# Patient Record
Sex: FEMALE | Race: WHITE | NOT HISPANIC OR LATINO | URBAN - METROPOLITAN AREA
[De-identification: names, ages, dates, MRNs, and addresses within clinical notes are randomized per-mention and may not be internally consistent; named-entity substitution may affect disease eponyms.]

---

## 2019-03-07 ENCOUNTER — HOSPITAL ENCOUNTER (OUTPATIENT)
Dept: RADIOLOGY | Facility: MEDICAL CENTER | Age: 44
End: 2019-03-07

## 2019-03-07 ENCOUNTER — HOSPITAL ENCOUNTER (INPATIENT)
Facility: MEDICAL CENTER | Age: 44
LOS: 1 days | DRG: 660 | End: 2019-03-08
Attending: EMERGENCY MEDICINE | Admitting: INTERNAL MEDICINE
Payer: COMMERCIAL

## 2019-03-07 ENCOUNTER — APPOINTMENT (OUTPATIENT)
Dept: RADIOLOGY | Facility: MEDICAL CENTER | Age: 44
DRG: 660 | End: 2019-03-07
Attending: UROLOGY

## 2019-03-07 DIAGNOSIS — N13.30 HYDRONEPHROSIS, UNSPECIFIED HYDRONEPHROSIS TYPE: ICD-10-CM

## 2019-03-07 DIAGNOSIS — N20.0 NEPHROLITHIASIS: ICD-10-CM

## 2019-03-07 PROBLEM — R57.9 SHOCK (HCC): Status: ACTIVE | Noted: 2019-03-07

## 2019-03-07 PROBLEM — Z72.0 TOBACCO ABUSE: Status: ACTIVE | Noted: 2019-03-07

## 2019-03-07 PROBLEM — N39.0 UTI (URINARY TRACT INFECTION): Status: ACTIVE | Noted: 2019-03-07

## 2019-03-07 LAB
ALBUMIN SERPL BCP-MCNC: 2.7 G/DL (ref 3.2–4.9)
ALBUMIN/GLOB SERPL: 0.9 G/DL
ALP SERPL-CCNC: 73 U/L (ref 30–99)
ALT SERPL-CCNC: 16 U/L (ref 2–50)
ANION GAP SERPL CALC-SCNC: 9 MMOL/L (ref 0–11.9)
APTT PPP: 35.6 SEC (ref 24.7–36)
AST SERPL-CCNC: 16 U/L (ref 12–45)
BASOPHILS # BLD AUTO: 0.3 % (ref 0–1.8)
BASOPHILS # BLD: 0.06 K/UL (ref 0–0.12)
BILIRUB SERPL-MCNC: 0.7 MG/DL (ref 0.1–1.5)
BUN SERPL-MCNC: 11 MG/DL (ref 8–22)
CALCIUM SERPL-MCNC: 7.8 MG/DL (ref 8.4–10.2)
CHLORIDE SERPL-SCNC: 96 MMOL/L (ref 96–112)
CO2 SERPL-SCNC: 25 MMOL/L (ref 20–33)
CREAT SERPL-MCNC: 1.4 MG/DL (ref 0.5–1.4)
EOSINOPHIL # BLD AUTO: 0.01 K/UL (ref 0–0.51)
EOSINOPHIL NFR BLD: 0 % (ref 0–6.9)
ERYTHROCYTE [DISTWIDTH] IN BLOOD BY AUTOMATED COUNT: 43.3 FL (ref 35.9–50)
GLOBULIN SER CALC-MCNC: 3.1 G/DL (ref 1.9–3.5)
GLUCOSE SERPL-MCNC: 95 MG/DL (ref 65–99)
HCT VFR BLD AUTO: 38 % (ref 37–47)
HGB BLD-MCNC: 12.7 G/DL (ref 12–16)
IMM GRANULOCYTES # BLD AUTO: 0.28 K/UL (ref 0–0.11)
IMM GRANULOCYTES NFR BLD AUTO: 1.2 % (ref 0–0.9)
INR PPP: 1.26 (ref 0.87–1.13)
LACTATE BLD-SCNC: 1.4 MMOL/L (ref 0.5–2)
LACTATE BLD-SCNC: 2 MMOL/L (ref 0.5–2)
LYMPHOCYTES # BLD AUTO: 2 K/UL (ref 1–4.8)
LYMPHOCYTES NFR BLD: 8.5 % (ref 22–41)
MCH RBC QN AUTO: 28.4 PG (ref 27–33)
MCHC RBC AUTO-ENTMCNC: 33.4 G/DL (ref 33.6–35)
MCV RBC AUTO: 85 FL (ref 81.4–97.8)
MONOCYTES # BLD AUTO: 1.26 K/UL (ref 0–0.85)
MONOCYTES NFR BLD AUTO: 5.4 % (ref 0–13.4)
NEUTROPHILS # BLD AUTO: 19.86 K/UL (ref 2–7.15)
NEUTROPHILS NFR BLD: 84.6 % (ref 44–72)
NRBC # BLD AUTO: 0 K/UL
NRBC BLD-RTO: 0 /100 WBC
PLATELET # BLD AUTO: 233 K/UL (ref 164–446)
PMV BLD AUTO: 9.1 FL (ref 9–12.9)
POTASSIUM SERPL-SCNC: 3.4 MMOL/L (ref 3.6–5.5)
PROT SERPL-MCNC: 5.8 G/DL (ref 6–8.2)
PROTHROMBIN TIME: 15.7 SEC (ref 12–14.6)
RBC # BLD AUTO: 4.47 M/UL (ref 4.2–5.4)
SODIUM SERPL-SCNC: 130 MMOL/L (ref 135–145)
WBC # BLD AUTO: 23.5 K/UL (ref 4.8–10.8)

## 2019-03-07 PROCEDURE — 770006 HCHG ROOM/CARE - MED/SURG/GYN SEMI*

## 2019-03-07 PROCEDURE — A9270 NON-COVERED ITEM OR SERVICE: HCPCS | Performed by: INTERNAL MEDICINE

## 2019-03-07 PROCEDURE — 160009 HCHG ANES TIME/MIN: Performed by: UROLOGY

## 2019-03-07 PROCEDURE — 99291 CRITICAL CARE FIRST HOUR: CPT | Mod: 25 | Performed by: INTERNAL MEDICINE

## 2019-03-07 PROCEDURE — 85025 COMPLETE CBC W/AUTO DIFF WBC: CPT

## 2019-03-07 PROCEDURE — 87040 BLOOD CULTURE FOR BACTERIA: CPT

## 2019-03-07 PROCEDURE — 501838 HCHG SUTURE GENERAL: Performed by: UROLOGY

## 2019-03-07 PROCEDURE — 501329 HCHG SET, CYSTO IRRIG Y TUR: Performed by: UROLOGY

## 2019-03-07 PROCEDURE — 99291 CRITICAL CARE FIRST HOUR: CPT

## 2019-03-07 PROCEDURE — 83605 ASSAY OF LACTIC ACID: CPT

## 2019-03-07 PROCEDURE — 700101 HCHG RX REV CODE 250

## 2019-03-07 PROCEDURE — 700111 HCHG RX REV CODE 636 W/ 250 OVERRIDE (IP): Performed by: INTERNAL MEDICINE

## 2019-03-07 PROCEDURE — 99407 BEHAV CHNG SMOKING > 10 MIN: CPT | Performed by: INTERNAL MEDICINE

## 2019-03-07 PROCEDURE — 0T768DZ DILATION OF RIGHT URETER WITH INTRALUMINAL DEVICE, VIA NATURAL OR ARTIFICIAL OPENING ENDOSCOPIC: ICD-10-PCS | Performed by: UROLOGY

## 2019-03-07 PROCEDURE — 36415 COLL VENOUS BLD VENIPUNCTURE: CPT

## 2019-03-07 PROCEDURE — 80053 COMPREHEN METABOLIC PANEL: CPT

## 2019-03-07 PROCEDURE — 85610 PROTHROMBIN TIME: CPT

## 2019-03-07 PROCEDURE — 160036 HCHG PACU - EA ADDL 30 MINS PHASE I: Performed by: UROLOGY

## 2019-03-07 PROCEDURE — 160035 HCHG PACU - 1ST 60 MINS PHASE I: Performed by: UROLOGY

## 2019-03-07 PROCEDURE — 700102 HCHG RX REV CODE 250 W/ 637 OVERRIDE(OP): Performed by: INTERNAL MEDICINE

## 2019-03-07 PROCEDURE — 160048 HCHG OR STATISTICAL LEVEL 1-5: Performed by: UROLOGY

## 2019-03-07 PROCEDURE — 500879 HCHG PACK, CYSTO: Performed by: UROLOGY

## 2019-03-07 PROCEDURE — 700105 HCHG RX REV CODE 258: Performed by: EMERGENCY MEDICINE

## 2019-03-07 PROCEDURE — 85730 THROMBOPLASTIN TIME PARTIAL: CPT

## 2019-03-07 PROCEDURE — C2617 STENT, NON-COR, TEM W/O DEL: HCPCS | Performed by: UROLOGY

## 2019-03-07 PROCEDURE — 160002 HCHG RECOVERY MINUTES (STAT): Performed by: UROLOGY

## 2019-03-07 PROCEDURE — 700111 HCHG RX REV CODE 636 W/ 250 OVERRIDE (IP)

## 2019-03-07 PROCEDURE — 700105 HCHG RX REV CODE 258: Performed by: INTERNAL MEDICINE

## 2019-03-07 PROCEDURE — 160028 HCHG SURGERY MINUTES - 1ST 30 MINS LEVEL 3: Performed by: UROLOGY

## 2019-03-07 DEVICE — STENT UROLOGICAL POLARIS 6X26  ULTRA: Type: IMPLANTABLE DEVICE | Site: URETER | Status: FUNCTIONAL

## 2019-03-07 RX ORDER — BISACODYL 10 MG
10 SUPPOSITORY, RECTAL RECTAL
Status: DISCONTINUED | OUTPATIENT
Start: 2019-03-07 | End: 2019-03-08 | Stop reason: HOSPADM

## 2019-03-07 RX ORDER — OXYCODONE HCL 5 MG/5 ML
5 SOLUTION, ORAL ORAL
Status: DISCONTINUED | OUTPATIENT
Start: 2019-03-07 | End: 2019-03-07 | Stop reason: HOSPADM

## 2019-03-07 RX ORDER — HYDRALAZINE HYDROCHLORIDE 20 MG/ML
5 INJECTION INTRAMUSCULAR; INTRAVENOUS
Status: DISCONTINUED | OUTPATIENT
Start: 2019-03-07 | End: 2019-03-07 | Stop reason: HOSPADM

## 2019-03-07 RX ORDER — OXYCODONE HYDROCHLORIDE 5 MG/1
10 TABLET ORAL
Status: DISCONTINUED | OUTPATIENT
Start: 2019-03-07 | End: 2019-03-08 | Stop reason: HOSPADM

## 2019-03-07 RX ORDER — HYDROMORPHONE HYDROCHLORIDE 1 MG/ML
0.1 INJECTION, SOLUTION INTRAMUSCULAR; INTRAVENOUS; SUBCUTANEOUS
Status: DISCONTINUED | OUTPATIENT
Start: 2019-03-07 | End: 2019-03-07 | Stop reason: HOSPADM

## 2019-03-07 RX ORDER — ONDANSETRON 2 MG/ML
4 INJECTION INTRAMUSCULAR; INTRAVENOUS
Status: DISCONTINUED | OUTPATIENT
Start: 2019-03-07 | End: 2019-03-07 | Stop reason: HOSPADM

## 2019-03-07 RX ORDER — OXYCODONE HCL 5 MG/5 ML
10 SOLUTION, ORAL ORAL
Status: DISCONTINUED | OUTPATIENT
Start: 2019-03-07 | End: 2019-03-07 | Stop reason: HOSPADM

## 2019-03-07 RX ORDER — KETOROLAC TROMETHAMINE 30 MG/ML
30 INJECTION, SOLUTION INTRAMUSCULAR; INTRAVENOUS EVERY 6 HOURS PRN
Status: DISCONTINUED | OUTPATIENT
Start: 2019-03-07 | End: 2019-03-08 | Stop reason: HOSPADM

## 2019-03-07 RX ORDER — ACETAMINOPHEN 325 MG/1
650 TABLET ORAL EVERY 6 HOURS PRN
Status: DISCONTINUED | OUTPATIENT
Start: 2019-03-07 | End: 2019-03-08 | Stop reason: HOSPADM

## 2019-03-07 RX ORDER — SODIUM CHLORIDE 9 MG/ML
30 INJECTION, SOLUTION INTRAVENOUS
Status: DISCONTINUED | OUTPATIENT
Start: 2019-03-07 | End: 2019-03-08 | Stop reason: HOSPADM

## 2019-03-07 RX ORDER — PROMETHAZINE HYDROCHLORIDE 25 MG/1
12.5-25 SUPPOSITORY RECTAL EVERY 4 HOURS PRN
Status: DISCONTINUED | OUTPATIENT
Start: 2019-03-07 | End: 2019-03-08 | Stop reason: HOSPADM

## 2019-03-07 RX ORDER — ONDANSETRON 4 MG/1
4 TABLET, ORALLY DISINTEGRATING ORAL EVERY 4 HOURS PRN
Status: DISCONTINUED | OUTPATIENT
Start: 2019-03-07 | End: 2019-03-08 | Stop reason: HOSPADM

## 2019-03-07 RX ORDER — HYDROMORPHONE HYDROCHLORIDE 1 MG/ML
0.4 INJECTION, SOLUTION INTRAMUSCULAR; INTRAVENOUS; SUBCUTANEOUS
Status: DISCONTINUED | OUTPATIENT
Start: 2019-03-07 | End: 2019-03-07 | Stop reason: HOSPADM

## 2019-03-07 RX ORDER — SODIUM CHLORIDE 9 MG/ML
1000 INJECTION, SOLUTION INTRAVENOUS ONCE
Status: COMPLETED | OUTPATIENT
Start: 2019-03-07 | End: 2019-03-07

## 2019-03-07 RX ORDER — MEPERIDINE HYDROCHLORIDE 25 MG/ML
12.5 INJECTION INTRAMUSCULAR; INTRAVENOUS; SUBCUTANEOUS
Status: DISCONTINUED | OUTPATIENT
Start: 2019-03-07 | End: 2019-03-07 | Stop reason: HOSPADM

## 2019-03-07 RX ORDER — SODIUM CHLORIDE 9 MG/ML
INJECTION, SOLUTION INTRAVENOUS CONTINUOUS
Status: DISCONTINUED | OUTPATIENT
Start: 2019-03-07 | End: 2019-03-08 | Stop reason: HOSPADM

## 2019-03-07 RX ORDER — PROMETHAZINE HYDROCHLORIDE 25 MG/1
12.5-25 TABLET ORAL EVERY 4 HOURS PRN
Status: DISCONTINUED | OUTPATIENT
Start: 2019-03-07 | End: 2019-03-08 | Stop reason: HOSPADM

## 2019-03-07 RX ORDER — POLYETHYLENE GLYCOL 3350 17 G/17G
1 POWDER, FOR SOLUTION ORAL
Status: DISCONTINUED | OUTPATIENT
Start: 2019-03-07 | End: 2019-03-08 | Stop reason: HOSPADM

## 2019-03-07 RX ORDER — ONDANSETRON 2 MG/ML
4 INJECTION INTRAMUSCULAR; INTRAVENOUS EVERY 4 HOURS PRN
Status: DISCONTINUED | OUTPATIENT
Start: 2019-03-07 | End: 2019-03-08 | Stop reason: HOSPADM

## 2019-03-07 RX ORDER — SODIUM CHLORIDE 9 MG/ML
1000 INJECTION, SOLUTION INTRAVENOUS
Status: DISCONTINUED | OUTPATIENT
Start: 2019-03-07 | End: 2019-03-08 | Stop reason: HOSPADM

## 2019-03-07 RX ORDER — DIPHENHYDRAMINE HYDROCHLORIDE 50 MG/ML
6.25 INJECTION INTRAMUSCULAR; INTRAVENOUS
Status: DISCONTINUED | OUTPATIENT
Start: 2019-03-07 | End: 2019-03-07 | Stop reason: HOSPADM

## 2019-03-07 RX ORDER — HYDROMORPHONE HYDROCHLORIDE 1 MG/ML
0.5 INJECTION, SOLUTION INTRAMUSCULAR; INTRAVENOUS; SUBCUTANEOUS
Status: DISCONTINUED | OUTPATIENT
Start: 2019-03-07 | End: 2019-03-08 | Stop reason: HOSPADM

## 2019-03-07 RX ORDER — SODIUM CHLORIDE, SODIUM LACTATE, POTASSIUM CHLORIDE, CALCIUM CHLORIDE 600; 310; 30; 20 MG/100ML; MG/100ML; MG/100ML; MG/100ML
INJECTION, SOLUTION INTRAVENOUS CONTINUOUS
Status: DISCONTINUED | OUTPATIENT
Start: 2019-03-07 | End: 2019-03-07 | Stop reason: HOSPADM

## 2019-03-07 RX ORDER — HYDRALAZINE HYDROCHLORIDE 20 MG/ML
10 INJECTION INTRAMUSCULAR; INTRAVENOUS EVERY 4 HOURS PRN
Status: DISCONTINUED | OUTPATIENT
Start: 2019-03-07 | End: 2019-03-08 | Stop reason: HOSPADM

## 2019-03-07 RX ORDER — AMOXICILLIN 250 MG
2 CAPSULE ORAL 2 TIMES DAILY
Status: DISCONTINUED | OUTPATIENT
Start: 2019-03-07 | End: 2019-03-08 | Stop reason: HOSPADM

## 2019-03-07 RX ORDER — OXYCODONE HYDROCHLORIDE 5 MG/1
5 TABLET ORAL
Status: DISCONTINUED | OUTPATIENT
Start: 2019-03-07 | End: 2019-03-08 | Stop reason: HOSPADM

## 2019-03-07 RX ORDER — HYDROMORPHONE HYDROCHLORIDE 1 MG/ML
0.2 INJECTION, SOLUTION INTRAMUSCULAR; INTRAVENOUS; SUBCUTANEOUS
Status: DISCONTINUED | OUTPATIENT
Start: 2019-03-07 | End: 2019-03-07 | Stop reason: HOSPADM

## 2019-03-07 RX ORDER — HALOPERIDOL 5 MG/ML
1 INJECTION INTRAMUSCULAR
Status: DISCONTINUED | OUTPATIENT
Start: 2019-03-07 | End: 2019-03-07 | Stop reason: HOSPADM

## 2019-03-07 RX ORDER — IBUPROFEN 200 MG
400 TABLET ORAL EVERY 6 HOURS PRN
COMMUNITY

## 2019-03-07 RX ADMIN — STANDARDIZED SENNA CONCENTRATE AND DOCUSATE SODIUM 2 TABLET: 8.6; 5 TABLET, FILM COATED ORAL at 21:04

## 2019-03-07 RX ADMIN — SODIUM CHLORIDE 1000 ML: 9 INJECTION, SOLUTION INTRAVENOUS at 16:48

## 2019-03-07 RX ADMIN — SODIUM CHLORIDE: 9 INJECTION, SOLUTION INTRAVENOUS at 21:08

## 2019-03-07 RX ADMIN — CEFTRIAXONE SODIUM 2 G: 2 INJECTION, POWDER, FOR SOLUTION INTRAMUSCULAR; INTRAVENOUS at 21:05

## 2019-03-07 ASSESSMENT — ENCOUNTER SYMPTOMS
COUGH: 0
NAUSEA: 1
FEVER: 1
SPUTUM PRODUCTION: 0
STRIDOR: 0
DEPRESSION: 0
FLANK PAIN: 1
LOSS OF CONSCIOUSNESS: 0
WEAKNESS: 0
FALLS: 0
DIARRHEA: 0
HEADACHES: 0
VOMITING: 1
TINGLING: 0
CONSTIPATION: 0
CHILLS: 1
MYALGIAS: 0
PALPITATIONS: 0
SHORTNESS OF BREATH: 0
ABDOMINAL PAIN: 1
DIZZINESS: 0

## 2019-03-07 ASSESSMENT — COGNITIVE AND FUNCTIONAL STATUS - GENERAL
DAILY ACTIVITIY SCORE: 24
SUGGESTED CMS G CODE MODIFIER DAILY ACTIVITY: CH
MOBILITY SCORE: 24
SUGGESTED CMS G CODE MODIFIER MOBILITY: CH

## 2019-03-07 ASSESSMENT — COPD QUESTIONNAIRES
DO YOU EVER COUGH UP ANY MUCUS OR PHLEGM?: NO/ONLY WITH OCCASIONAL COLDS OR INFECTIONS
DURING THE PAST 4 WEEKS HOW MUCH DID YOU FEEL SHORT OF BREATH: SOME OF THE TIME
HAVE YOU SMOKED AT LEAST 100 CIGARETTES IN YOUR ENTIRE LIFE: YES
IN THE PAST 12 MONTHS DO YOU DO LESS THAN YOU USED TO BECAUSE OF YOUR BREATHING PROBLEMS: DISAGREE/UNSURE
COPD SCREENING SCORE: 3

## 2019-03-07 ASSESSMENT — LIFESTYLE VARIABLES
ALCOHOL_USE: NO
EVER_SMOKED: YES

## 2019-03-07 ASSESSMENT — PATIENT HEALTH QUESTIONNAIRE - PHQ9
2. FEELING DOWN, DEPRESSED, IRRITABLE, OR HOPELESS: NOT AT ALL
SUM OF ALL RESPONSES TO PHQ9 QUESTIONS 1 AND 2: 0
1. LITTLE INTEREST OR PLEASURE IN DOING THINGS: NOT AT ALL

## 2019-03-07 NOTE — ED TRIAGE NOTES
".  Chief Complaint   Patient presents with   • Nephrolithiasis     Pt sent here from [facility] for an obstructive kidney stone on the right side.     .BP (!) 85/56   Pulse 83   Temp 37 °C (98.6 °F) (Temporal)   Resp 16   Ht 1.626 m (5' 4\")   Wt 65.8 kg (145 lb)   LMP  (Within Weeks)   SpO2 96%   BMI 24.89 kg/m²     Prior to transport in [facility] pt received, 8mg morphine, 4mg zofran, 30mg toradol, and 1g rocephin  "

## 2019-03-07 NOTE — ED NOTES
Med rec complete per pt at bedside  Allergies have been verified and updated  No oral ABX within the last 30 days

## 2019-03-08 ENCOUNTER — PATIENT OUTREACH (OUTPATIENT)
Dept: HEALTH INFORMATION MANAGEMENT | Facility: OTHER | Age: 44
End: 2019-03-08

## 2019-03-08 VITALS
RESPIRATION RATE: 18 BRPM | DIASTOLIC BLOOD PRESSURE: 62 MMHG | SYSTOLIC BLOOD PRESSURE: 107 MMHG | HEART RATE: 72 BPM | TEMPERATURE: 98.1 F | OXYGEN SATURATION: 97 % | BODY MASS INDEX: 24.75 KG/M2 | WEIGHT: 145 LBS | HEIGHT: 64 IN

## 2019-03-08 LAB
ALBUMIN SERPL BCP-MCNC: 2.4 G/DL (ref 3.2–4.9)
ALBUMIN/GLOB SERPL: 0.7 G/DL
ALP SERPL-CCNC: 81 U/L (ref 30–99)
ALT SERPL-CCNC: 21 U/L (ref 2–50)
ANION GAP SERPL CALC-SCNC: 8 MMOL/L (ref 0–11.9)
AST SERPL-CCNC: 21 U/L (ref 12–45)
BASOPHILS # BLD AUTO: 0.2 % (ref 0–1.8)
BASOPHILS # BLD: 0.04 K/UL (ref 0–0.12)
BILIRUB SERPL-MCNC: 0.6 MG/DL (ref 0.1–1.5)
BUN SERPL-MCNC: 17 MG/DL (ref 8–22)
CALCIUM SERPL-MCNC: 7.9 MG/DL (ref 8.4–10.2)
CHLORIDE SERPL-SCNC: 103 MMOL/L (ref 96–112)
CO2 SERPL-SCNC: 24 MMOL/L (ref 20–33)
CREAT SERPL-MCNC: 1.17 MG/DL (ref 0.5–1.4)
EOSINOPHIL # BLD AUTO: 0 K/UL (ref 0–0.51)
EOSINOPHIL NFR BLD: 0 % (ref 0–6.9)
ERYTHROCYTE [DISTWIDTH] IN BLOOD BY AUTOMATED COUNT: 44.8 FL (ref 35.9–50)
GLOBULIN SER CALC-MCNC: 3.3 G/DL (ref 1.9–3.5)
GLUCOSE SERPL-MCNC: 133 MG/DL (ref 65–99)
HCT VFR BLD AUTO: 35.4 % (ref 37–47)
HGB BLD-MCNC: 11.6 G/DL (ref 12–16)
IMM GRANULOCYTES # BLD AUTO: 0.23 K/UL (ref 0–0.11)
IMM GRANULOCYTES NFR BLD AUTO: 1.2 % (ref 0–0.9)
LACTATE BLD-SCNC: 1 MMOL/L (ref 0.5–2)
LACTATE BLD-SCNC: 1.3 MMOL/L (ref 0.5–2)
LACTATE BLD-SCNC: 2.1 MMOL/L (ref 0.5–2)
LYMPHOCYTES # BLD AUTO: 1 K/UL (ref 1–4.8)
LYMPHOCYTES NFR BLD: 5.2 % (ref 22–41)
MCH RBC QN AUTO: 28.3 PG (ref 27–33)
MCHC RBC AUTO-ENTMCNC: 32.8 G/DL (ref 33.6–35)
MCV RBC AUTO: 86.3 FL (ref 81.4–97.8)
MONOCYTES # BLD AUTO: 0.78 K/UL (ref 0–0.85)
MONOCYTES NFR BLD AUTO: 4 % (ref 0–13.4)
NEUTROPHILS # BLD AUTO: 17.24 K/UL (ref 2–7.15)
NEUTROPHILS NFR BLD: 89.4 % (ref 44–72)
NRBC # BLD AUTO: 0 K/UL
NRBC BLD-RTO: 0 /100 WBC
PLATELET # BLD AUTO: 231 K/UL (ref 164–446)
PMV BLD AUTO: 9.5 FL (ref 9–12.9)
POTASSIUM SERPL-SCNC: 4.2 MMOL/L (ref 3.6–5.5)
PROT SERPL-MCNC: 5.7 G/DL (ref 6–8.2)
RBC # BLD AUTO: 4.1 M/UL (ref 4.2–5.4)
SODIUM SERPL-SCNC: 135 MMOL/L (ref 135–145)
WBC # BLD AUTO: 19.3 K/UL (ref 4.8–10.8)

## 2019-03-08 PROCEDURE — 36415 COLL VENOUS BLD VENIPUNCTURE: CPT

## 2019-03-08 PROCEDURE — 99239 HOSP IP/OBS DSCHRG MGMT >30: CPT | Performed by: HOSPITALIST

## 2019-03-08 PROCEDURE — 85025 COMPLETE CBC W/AUTO DIFF WBC: CPT

## 2019-03-08 PROCEDURE — 80053 COMPREHEN METABOLIC PANEL: CPT

## 2019-03-08 PROCEDURE — 83605 ASSAY OF LACTIC ACID: CPT | Mod: 91

## 2019-03-08 PROCEDURE — 700102 HCHG RX REV CODE 250 W/ 637 OVERRIDE(OP): Performed by: INTERNAL MEDICINE

## 2019-03-08 PROCEDURE — A9270 NON-COVERED ITEM OR SERVICE: HCPCS | Performed by: INTERNAL MEDICINE

## 2019-03-08 RX ORDER — OXYCODONE HYDROCHLORIDE 5 MG/1
10 TABLET ORAL
Qty: 10 TAB | Refills: 0 | Status: SHIPPED | OUTPATIENT
Start: 2019-03-08 | End: 2019-03-15

## 2019-03-08 RX ORDER — PHENAZOPYRIDINE HYDROCHLORIDE 200 MG/1
200 TABLET, FILM COATED ORAL 3 TIMES DAILY PRN
Qty: 20 TAB | Refills: 0 | Status: SHIPPED | OUTPATIENT
Start: 2019-03-08 | End: 2019-03-15

## 2019-03-08 RX ORDER — OXYBUTYNIN CHLORIDE 10 MG/1
10 TABLET, EXTENDED RELEASE ORAL DAILY
Qty: 7 TAB | Refills: 0 | Status: SHIPPED | OUTPATIENT
Start: 2019-03-08 | End: 2019-03-15

## 2019-03-08 RX ORDER — CIPROFLOXACIN 500 MG/1
500 TABLET, FILM COATED ORAL 2 TIMES DAILY
Qty: 14 TAB | Refills: 0 | Status: SHIPPED | OUTPATIENT
Start: 2019-03-08 | End: 2019-03-15

## 2019-03-08 RX ADMIN — STANDARDIZED SENNA CONCENTRATE AND DOCUSATE SODIUM 2 TABLET: 8.6; 5 TABLET, FILM COATED ORAL at 05:23

## 2019-03-08 RX ADMIN — OXYCODONE HYDROCHLORIDE 5 MG: 5 TABLET ORAL at 11:04

## 2019-03-08 ASSESSMENT — PATIENT HEALTH QUESTIONNAIRE - PHQ9
2. FEELING DOWN, DEPRESSED, IRRITABLE, OR HOPELESS: NOT AT ALL
1. LITTLE INTEREST OR PLEASURE IN DOING THINGS: NOT AT ALL
SUM OF ALL RESPONSES TO PHQ9 QUESTIONS 1 AND 2: 0

## 2019-03-08 NOTE — PROGRESS NOTES
Received report from NOC RN; assumed pt care. Pt A&Ox4, sitting up in bed. Pt states 5/10 bladder pain. Pt voiding appropriately. Pt unaware if she can get a ride home today, lives in [place], [place], will call around and ask friends. Pt notified to call for assistance.

## 2019-03-08 NOTE — ED PROVIDER NOTES
"ED Provider Note    CHIEF COMPLAINT  Chief Complaint   Patient presents with   • Nephrolithiasis     Pt sent here from [facility] for an obstructive kidney stone on the right side.       HPI  Mariana Aguero is a 43 y.o. female who presents sent form [place] for an obstructing right kidney stone.  She has had 2-3 days of persistent nausea vomiting and abdominal pain.  She has been unable to keep anything down for the last 2 days.  She reports subjective fevers as well.  She is a smoker and has not had any cigarettes for the last 3 days because she is been feeling so bad.  She denies any chest pain or shortness of breath.  She has some history of a prior UTI.  She does not take any other medications.  Pain is better when she lays on her right side.      REVIEW OF SYSTEMS  positive for right-sided flank pain nausea vomiting subjective fevers, negative for diarrhea. All other systems are negative.     PAST MEDICAL HISTORY       SOCIAL HISTORY  Social History     Social History Main Topics   • Smoking status: Former Smoker     Quit date: 3/4/2019   • Smokeless tobacco: Never Used   • Alcohol use No   • Drug use: No   • Sexual activity: Not on file       SURGICAL HISTORY   has a past surgical history that includes tubal coagulation laparoscopic bilateral (1995).    CURRENT MEDICATIONS  Home Medications     Reviewed by Graeme Huynh (Pharmacy Tech) on 03/07/19 at 1548  Med List Status: Complete   Medication Last Dose Status   ibuprofen (MOTRIN) 200 MG Tab 3/4/2019 Active                ALLERGIES  Allergies   Allergen Reactions   • Penicillins Unspecified     Childhood        PHYSICAL EXAM  VITAL SIGNS: BP (!) 85/56   Pulse 90   Temp 36.1 °C (97 °F) (Temporal)   Resp (!) 22   Ht 1.626 m (5' 4\")   Wt 65.8 kg (145 lb)   LMP  (Within Weeks)   SpO2 97%   BMI 24.89 kg/m² .  Constitutional: Alert in no apparent distress.  HENT: No signs of trauma, Bilateral external ears normal, Nose normal.   Eyes: Pupils are " "equal and reactive, Conjunctiva normal, Non-icteric.   Neck: Normal range of motion, No tenderness, Supple, No stridor.   Cardiovascular: Regular rate and rhythm, no murmurs.   Thorax & Lungs: Normal breath sounds, No respiratory distress, No wheezing, No chest tenderness.   Abdomen: Bowel sounds normal, Soft, No tenderness, No masses, No peritoneal signs.  Skin: Warm, Dry, No erythema, No rash.   Back: No bony tenderness, right side CVA tenderness.   Musculoskeletal:  no major deformities noted.   Neurologic: Alert,  No focal deficits noted.   Psychiatric: Affect normal, Judgment normal, Mood normal.       DIAGNOSTIC STUDIES / PROCEDURES        LABS  Labs Reviewed   CBC WITH DIFFERENTIAL - Abnormal; Notable for the following:        Result Value    WBC 23.5 (*)     MCHC 33.4 (*)     Neutrophils-Polys 84.60 (*)     Lymphocytes 8.50 (*)     Immature Granulocytes 1.20 (*)     Neutrophils (Absolute) 19.86 (*)     Monos (Absolute) 1.26 (*)     Immature Granulocytes (abs) 0.28 (*)     All other components within normal limits   COMP METABOLIC PANEL - Abnormal; Notable for the following:     Sodium 130 (*)     Potassium 3.4 (*)     Calcium 7.8 (*)     Albumin 2.7 (*)     Total Protein 5.8 (*)     All other components within normal limits   PROTHROMBIN TIME - Abnormal; Notable for the following:     PT 15.7 (*)     INR 1.26 (*)     All other components within normal limits    Narrative:     If not done within the last 4 hours  Indicate which anticoagulants the patient is on:->NONE   ESTIMATED GFR - Abnormal; Notable for the following:     GFR If  49 (*)     GFR If Non  41 (*)     All other components within normal limits   BLOOD CULTURE    Narrative:     Per Hospital Policy: Only change Specimen Src: to \"Line\" if  specified by physician order.   LACTIC ACID   APTT    Narrative:     If not done within the last 4 hours  Indicate which anticoagulants the patient is on:->NONE   BLOOD CULTURE "   URINALYSIS   CULTURE RESPIRATORY W/ GRM STN   LACTIC ACID           COURSE & MEDICAL DECISION MAKING  Pertinent Labs & Imaging studies reviewed. (See chart for details)    Records from [place] are reviewed.  She had a white count of 27.1.  She had a sodium of 130 potassium 3.5 creatinine of 1.43.  Her urinalysis was positive for 2+ protein 2+ blood and 3+ leukocyte esterase.  Microscopic UA showed  white blood cells 5-10 RBCs and a few bacteria.  CT scan showed an 11.6 x 6.8 mm obstructing stone in the proximal right ureter with severe right sided hydronephrosis.  She was given 1 g of Rocephin as well as Zofran and Toradol and morphine.    This is a 43-year-old who presents with a obstructing right sided ureteral stone.  She was given Rocephin for concern of UTI although it is unclear if her urinalysis was a straight cath or clean catch.  She is afebrile here she is not tachycardic.  I do not think she is acutely septic although she does have some slight hypotension for this she was given IV fluids.  Repeat labs show white count at 23.5.  Lactate is normal at 1.4.    I spoke with Dr. Carrillo, urology who will take the patient to the operating room for a stent.  She will be admitted to the hospitalist service for further management.  I spoke with Dr. Chamorro who is agreeable to admit.    Patient will be admitted to the hospitalist service in guarded condition.    HYDRATION: Based on the patient's presentation of Hypotension the patient was given IV fluids. IV Hydration was used because oral hydration was not as rapid as required. Upon recheck following hydration, the patient was Brought to the operating room before reevaluation can occur.          FINAL IMPRESSION  1. Hydronephrosis, unspecified hydronephrosis type    2. Nephrolithiasis          This dictation has been creating using voice recognition software. The accuracy of the dictation is limited the abilities of the software.  I expect there may be some  errors of grammar and possibly content. I made every attempt to manually correct the errors within my dictation. However errors related to this voice recognition software may still exist and should be interpreted within the appropriate context.      The note accurately reflects work and decisions made by me.  Zuleyma Dickson  3/7/2019  6:16 PM

## 2019-03-08 NOTE — ED NOTES
Blood work optained-in no apparent distress, resting quietly on side. states pain 7/10 to rt side and back

## 2019-03-08 NOTE — DISCHARGE INSTRUCTIONS
Discharge Instructions    Discharged to home by car with relative. Discharged via wheelchair, hospital escort: Yes.  Special equipment needed: Not Applicable    Be sure to schedule a follow-up appointment with your primary care doctor or any specialists as instructed.     Discharge Plan:   Pneumococcal Vaccine Administered/Refused: Not given - Patient refused pneumococcal vaccine  Influenza Vaccine Indication: Indicated: 9 to 64 years of age    I understand that a diet low in cholesterol, fat, and sodium is recommended for good health. Unless I have been given specific instructions below for another diet, I accept this instruction as my diet prescription.   Other diet: Regular    Special Instructions: None    · Is patient discharged on Warfarin / Coumadin?   No     Depression / Suicide Risk    As you are discharged from this [facility] facility, it is important to learn how to keep safe from harming yourself.    Recognize the warning signs:  · Abrupt changes in personality, positive or negative- including increase in energy   · Giving away possessions  · Change in eating patterns- significant weight changes-  positive or negative  · Change in sleeping patterns- unable to sleep or sleeping all the time   · Unwillingness or inability to communicate  · Depression  · Unusual sadness, discouragement and loneliness  · Talk of wanting to die  · Neglect of personal appearance   · Rebelliousness- reckless behavior  · Withdrawal from people/activities they love  · Confusion- inability to concentrate     If you or a loved one observes any of these behaviors or has concerns about self-harm, here's what you can do:  · Talk about it- your feelings and reasons for harming yourself  · Remove any means that you might use to hurt yourself (examples: pills, rope, extension cords, firearm)  · Get professional help from the community (Mental Health, Substance Abuse, psychological counseling)  · Do not be alone:Call your Safe Contact-  someone whom you trust who will be there for you.  · Call your local CRISIS HOTLINE 431-4790 or 021-427-6485  · Call your local Children's Mobile Crisis Response Team [place] (805) 851-1912 or www.[employer]  · Call the toll free National Suicide Prevention Hotlines   · National Suicide Prevention Lifeline 257-193-GNBF (1320)  · "[employer] Line Network 800-SUICIDE (141-6630)

## 2019-03-08 NOTE — DISCHARGE PLANNING
Anticipated Discharge Disposition: pt to d/c back home to [place]    Action: Spoke to bedside RN. Pt states she has no transportation back home and has no friends or relatives who own a car.    Barriers to Discharge: TBD    Plan: f/u w/ pt, bedside RN

## 2019-03-08 NOTE — PROGRESS NOTES
"Urology Progress Note    Post op Day # 1. Right stent placement    Overnight Events: None    S: No fevers, chills, nausea or vomiting.  Pain improved. Labs stable. No complaints.    O:   Blood pressure 107/62, pulse 72, temperature 36.7 °C (98.1 °F), temperature source Oral, resp. rate 18, height 1.626 m (5' 4\"), weight 65.8 kg (145 lb), last menstrual period 02/03/2019, SpO2 97 %, not currently breastfeeding.  Recent Labs      03/07/19   1651  03/08/19   0506   SODIUM  130*  135   POTASSIUM  3.4*  4.2   CHLORIDE  96  103   CO2  25  24   GLUCOSE  95  133*   BUN  11  17   CREATININE  1.40  1.17   CALCIUM  7.8*  7.9*     Recent Labs      03/07/19   1651  03/08/19   0506   WBC  23.5*  19.3*   RBC  4.47  4.10*   HEMOGLOBIN  12.7  11.6*   HEMATOCRIT  38.0  35.4*   MCV  85.0  86.3   MCH  28.4  28.3   MCHC  33.4*  32.8*   RDW  43.3  44.8   PLATELETCT  233  231   MPV  9.1  9.5         Intake/Output Summary (Last 24 hours) at 03/08/19 0821  Last data filed at 03/07/19 2315   Gross per 24 hour   Intake             3390 ml   Output             1175 ml   Net             2215 ml       Exam:  Abdomen soft, benign.   Urine: pink      A/P:    Active Hospital Problems    Diagnosis   • Nephrolithiasis [N20.0]     Priority: High   • Hydronephrosis [N13.30]     Priority: Medium   • UTI (urinary tract infection) [N39.0]     Priority: Medium   • Shock (HCC) [R57.9]     Priority: Medium   • Tobacco abuse [Z72.0]       Stable.   Ambulate, IS.  Urology ok with DC home  F/U with Dr Tee for R CULTS 1-2 weeks  "

## 2019-03-08 NOTE — CONSULTS
"DATE OF SERVICE:  03/07/2019    UROLOGY CONSULTATION    REASON FOR CONSULTATION:  Urology service was consulted by Dr. Dickson of the   emergency department for advice and opinion regarding this woman's obstructing   ureteral stone and likely urinary tract infection.    HISTORY OF PRESENT ILLNESS:  The patient is 43.  Beginning 2 days ago, with   the onset of left-sided flank and abdominal pain.  This has been associated   with ongoing nausea and vomiting.  She has had tactile fevers.  No reported   chills.  No dysuria, but she claims her urine has been feeling \"hot.\"  No GI   complaints.    For detailed account of the patient's past medical, surgical, social history   and review of systems, please see Dr. Dickson's history and physical dated   today in the patient's chart.    PHYSICAL EXAMINATION:  GENERAL:  Well-nourished, well-developed female, overweight, lying in [facility] in no acute distress.  VITAL SIGNS:  Temperature is 37, pulse 90, blood pressure 85/56.  HEENT:  Oropharynx is clear.  NECK:  No cervical lymphadenopathy.  CHEST:  Rises symmetric.  HEART:  Regular, 2+ radial pulses bilaterally.  SKIN:  Warm and dry.  NEUROLOGIC:  Grossly intact.  EXTREMITIES:  No lower extremity edema.  ABDOMEN:  Soft, nondistended, nontender.  No suprapubic fullness or   tenderness.    LABORATORY DATA:  White blood cell count 24,000, hematocrit 38.  Creatinine   1.4, potassium 3.4.  CT scan images reviewed demonstrates moderate right   hydronephrosis, perinephric stranding.  An 11 mm proximal ureteral stone.  No   other stones identified.  Urinalysis, outside urinalysis is suggestive of   infection.  She has been treated with IV antibiotics.    IMPRESSION AND PLAN:  A 43-year-old woman with an 11 mm proximal right   ureteral stone with findings highly suggestive of urinary tract infection.  I   explained my concerns for this as a set up for developing sepsis and I have   advocated for placement of a ureteral stent " to allow for drainage of the   system.  I have also explained the risks of this procedure, which include but   not limited to bleeding, infection, injury to the ureter, kidney and need for   further stone surgery, stent colic, absolute need for followup.  The patient   expressed adequate understanding and wished to proceed.       ____________________________________     Gus Tee MD MCM / NTS    DD:  03/07/2019 17:33:50  DT:  03/07/2019 22:00:52    D#:  [id number]  Job#:  [id number]    cc: Zuleyma Dickson MD

## 2019-03-08 NOTE — DIETARY
"Nutrition services: Day 1 of admit.  Mariana Aguero is a 43 y.o. female with admitting DX of hydronephrosis with stent placed by urology.  Consult received for poor PO PTA.      Assessment:  Height: 162.6 cm (5' 4\")  Weight: 65.8 kg (145 lb)  Body mass index is 24.89 kg/m².  Diet/Intake: regular/ 50-75%    Evaluation:   1. Per MD documentation, pt was in her usual state of health until a couple of days ago. Poor PO intake was caused by  pain, nausea and vomiting related to current diagnosis. Recorded PO intake is good after recent procedure. Pt with BMI indicating she is at a healthy weight.     Malnutrition Risk: No criteria met for malnutrition    Recommendations/Plan:  1. Provide diet as ordered.    2. Encourage intake of > 50%  3. Document intake of all meals  as % taken in ADL's to provide interdisciplinary communication across all shifts.   4. Monitor weight.  5. Nutrition rep will continue to see patient for ongoing meal and snack preferences.   6. RD will monitor.            "

## 2019-03-08 NOTE — DISCHARGE PLANNING
Anticipated Discharge Disposition: d/c home    Action: Spoke to pt at bedside. Her friend, Rhea, will p/u pt today and take pt home to [place].    Pt lives at address [address] [place] [address]    She lives w/ a roommate.     She will use pharmacy in [place],    [facility] [address] [place] [address]    Bedside RN indicates hospitalist is licensed in [place] so pt can p/u her d/cing scripts at the [place] pharmacy w/ the [employer] [place].    Barriers to Discharge: none    Plan: pt to d/c w/ friend today and will p/u scripts in [place] when arrives home

## 2019-03-08 NOTE — ASSESSMENT & PLAN NOTE
-Tobacco cessation counseling and education provided for more than 10 minutes. Nicotine replacement options provided including patch, and further medical treatments including Wellbutrin and chantix.  As well as over the counter options of lozenges and gum.

## 2019-03-08 NOTE — H&P
Hospital Medicine History & Physical Note    Date of Service  3/7/2019    Primary Care Physician  None    Consultants  Urology    Code Status  Full    Chief Complaint  Abdominal and flank pain    History of Presenting Illness  43 y.o. female who presented 3/7/2019 with abdominal and flank pain.  Patient states she was in her usual state of health until couple nights ago when she developed right lower quadrant abdominal pain.  She described this as sharp, 10/10 at its worst associated with profound nausea and vomiting.  Patient states she has not been able to eat anything since due to the nausea and vomiting.  Patient states she developed right flank pain shortly thereafter.  She also complained of fever, chills as well as burning urine.  She initially presented to [facility], CT scan was obtained there which showed an obstructing right-sided stone causing hydronephrosis.  Upon arrival here, urology was consulted and patient is being taken for procedure now.    Review of Systems  Review of Systems   Constitutional: Positive for chills, fever and malaise/fatigue.   HENT: Negative for congestion.    Respiratory: Negative for cough, sputum production, shortness of breath and stridor.    Cardiovascular: Negative for chest pain, palpitations and leg swelling.   Gastrointestinal: Positive for abdominal pain, nausea and vomiting. Negative for constipation and diarrhea.   Genitourinary: Positive for dysuria and flank pain. Negative for urgency.   Musculoskeletal: Negative for falls and myalgias.   Neurological: Negative for dizziness, tingling, loss of consciousness, weakness and headaches.   Psychiatric/Behavioral: Negative for depression and suicidal ideas.   All other systems reviewed and are negative.      Past Medical History  None    Surgical History   has a past surgical history that includes tubal coagulation laparoscopic bilateral (1995).     Family History  Reviewed, noncontributory    Social History   reports  that she is smoking. She has never used smokeless tobacco. She reports that she does not drink alcohol or use drugs.    Allergies  Allergies   Allergen Reactions   • Penicillins Unspecified     Childhood        Medications  Prior to Admission Medications   Prescriptions Last Dose Informant Patient Reported? Taking?   ibuprofen (MOTRIN) 200 MG Tab 3/4/2019 at [facility] Patient Yes Yes   Sig: Take 400 mg by mouth every 6 hours as needed for Mild Pain.      Facility-Administered Medications: None       Physical Exam  Temp:  [37 °C (98.6 °F)] 37 °C (98.6 °F)  Pulse:  [82-90] 90  Resp:  [16] 16  BP: (85)/(56) 85/56  SpO2:  [94 %-97 %] 94 %    Physical Exam   Constitutional: She is oriented to person, place, and time. She appears well-developed. She is cooperative. No distress.   HENT:   Head: Normocephalic and atraumatic. Not macrocephalic and not microcephalic. Head is without raccoon's eyes and without Campbell's sign.   Right Ear: External ear normal.   Left Ear: External ear normal.   Mouth/Throat: Mucous membranes are dry. No oropharyngeal exudate.   Eyes: Conjunctivae are normal. Right eye exhibits no discharge. Left eye exhibits no discharge. No scleral icterus.   Neck: Neck supple. No tracheal deviation present.   Cardiovascular: Normal rate, regular rhythm and intact distal pulses.  Exam reveals no gallop, no distant heart sounds and no friction rub.    No murmur heard.  Pulmonary/Chest: Effort normal. No accessory muscle usage or stridor. No tachypnea and no bradypnea. No respiratory distress. She has no decreased breath sounds. She has no wheezes. She has no rhonchi. She has no rales. She exhibits no tenderness.   Abdominal: Soft. Bowel sounds are normal. She exhibits no distension. There is no hepatosplenomegaly, splenomegaly or hepatomegaly. There is tenderness in the right lower quadrant. There is CVA tenderness. There is no rebound and no guarding.   Musculoskeletal: Normal range of motion. She exhibits no edema  or tenderness.   Lymphadenopathy:     She has no cervical adenopathy.   Neurological: She is alert and oriented to person, place, and time. No cranial nerve deficit. She displays no seizure activity.   Skin: Skin is warm, dry and intact. No rash noted. She is not diaphoretic. No erythema. No pallor.   Psychiatric: She has a normal mood and affect. Her speech is normal and behavior is normal. Judgment and thought content normal. Cognition and memory are normal.   Nursing note and vitals reviewed.      Laboratory:  Recent Labs      03/07/19   1651   WBC  23.5*   RBC  4.47   HEMOGLOBIN  12.7   HEMATOCRIT  38.0   MCV  85.0   MCH  28.4   MCHC  33.4*   RDW  43.3   PLATELETCT  233   MPV  9.1         No results for input(s): ALTSGPT, ASTSGOT, ALKPHOSPHAT, TBILIRUBIN, DBILIRUBIN, GAMMAGT, AMYLASE, LIPASE, ALB, PREALBUMIN, GLUCOSE in the last 72 hours.              No results for input(s): TROPONINI in the last 72 hours.    Urinalysis:    No results found     Imaging:  OUTSIDE IMAGES-CT ABDOMEN /PELVIS   Final Result      OUTSIDE IMAGES-DX CHEST   Final Result            Assessment/Plan:  I anticipate this patient will require at least two midnights for appropriate medical management, necessitating inpatient admission.    Nephrolithiasis- (present on admission)   Assessment & Plan    -Causing significant abdominal and flank pain  -Also causing hydronephrosis  -Patient is going to procedure now, urology to place stenting  -Pain management with IV Dilaudid as well as IV Toradol     Shock (HCC)- (present on admission)   Assessment & Plan    -Due to dehydration  -Patient has been unable to keep anything down for 2 days  -I will start IV fluids, patient will require significant IV fluids  -I have given IV fluid bolus, repeat until blood pressure is improved     UTI (urinary tract infection)- (present on admission)   Assessment & Plan    -borderline UA from outside facility  -Repeat urinalysis here  -She has been given  Rocephin  -Patient is not septic at this point, heart rates 82, respiratory rate 16 and temperature is 98.6, does have a significant elevation in white blood cell count, normal lactic acid  -I think she is at high risk for going into sepsis if that infected stone, will go ahead and start sepsis order set so that she gets the fluids from this but again at this point in time she is not septic  -Await culture results  -Await repeat urinalysis     Hydronephrosis- (present on admission)   Assessment & Plan    -Due to obstructing stone  -Start IV fluids  -Urology to place stent     Tobacco abuse- (present on admission)   Assessment & Plan    -Tobacco cessation counseling and education provided for more than 10 minutes. Nicotine replacement options provided including patch, and further medical treatments including Wellbutrin and chantix.  As well as over the counter options of lozenges and gum.     Patient is critically ill.   The patient continues to have : Shock  The vital organ system that is effected is the: All are at risk  If untreated there is a high chance of deterioration into: Worsening shock and death  The critical care that I am providing today is: Continuous monitoring of blood pressure with full IV fluid boluses, pressor support  The critical care that has been undertaken is medically complex.   There has been no overlap in critical care time.  Critical care time not including procedures, no overlap: 39 minutes    VTE prophylaxis: SCDs

## 2019-03-08 NOTE — OP REPORT
DATE OF SERVICE:  03/07/2019    NAME OF OPERATION:  Cystoscopy with right ureteral stent placement.    PREOPERATIVE DIAGNOSES:  1.  Right proximal ureteral stone, 11 mm.  2.  Hydronephrosis.  3.  Likely urinary tract infection/pyelonephritis.    POSTOPERATIVE DIAGNOSES:  1.  Right proximal ureteral stone, 11 mm.  2.  Hydronephrosis.  3.  Likely urinary tract infection/pyelonephritis.    PRIMARY SURGEON:  Gus Tee MD    ANESTHESIOLOGIST:  Lonny Saldivar MD    FINDINGS:  A 26 cmx6-[place] ureteral stent placed with dislodgement of stone.    INDICATIONS:  Briefly, the patient is a 43-year-old woman with a history of   severe colic and intense nausea and vomiting for several days.  She was also   found to have what appears to be a likely urinary tract infection with a very   elevated white blood cell count.  Upon consideration of options, she elected   to undergo placement of ureteral stent.  Informed consent has been obtained.    OPERATION IN DETAIL:  The patient was taken to the operating room, placed on   the operating table in supine position.  After administration of general   anesthetic, she was placed in lithotomy.  Genitals were prepped and draped   sterilely.  A 21-[place] cystoscope was passed in the bladder.  Bladder was   within normal limits with the exception of some inflammatory changes   suggestive of cystitis.  Right ureteral orifice was identified.  A 0.035   guidewire was passed under fluoroscopic guidance to the level of the stone.    Here, we did have a little bit of difficulty traversing the stone traversing   the portion of the ureter with the stone, but eventually did pass.  A 26   cmx6-[place] ureteral stent was then passed over the wire.  It appeared to be   positioned well with its proximal J seen curling in the right renal pelvis   vicinity.  Its distal J was in the bladder.  There was excellent efflux of   cloudy urine from the stent with its placement.  The patient tolerated    procedure well and was taken to recovery room in stable condition.  She will   be admitted to the hospital service for ongoing supportive care including IV   antibiotics and close observation.  We will be making arrangements as an   outpatient for definitive stone management once she has recovered from this.       ____________________________________     Gus Tee MD MCM / ELLIS    DD:  03/07/2019 17:58:24  DT:  03/07/2019 21:24:07    D#:  [id number]  Job#:  [id number]

## 2019-03-08 NOTE — CARE PLAN
Problem: Infection  Goal: Will remain free from infection  Outcome: PROGRESSING AS EXPECTED    Intervention: Implement standard precautions and perform hand washing before and after patient contact  Hand washing every encounter. IV ports scrubbed with alcohol when hanging medicine. Patient watch for s/s of infection. Patient taught to report s/s of infection, verbalizes understanding.      Problem: Venous Thromboembolism (VTW)/Deep Vein Thrombosis (DVT) Prevention:  Goal: Patient will participate in Venous Thrombosis (VTE)/Deep Vein Thrombosis (DVT)Prevention Measures  Outcome: PROGRESSING AS EXPECTED    Intervention: Ensure patient wears graduated elastic stockings (LILIANE hose) and/or SCDs, if ordered, when in bed or chair (Remove at least once per shift for skin check)  SCDs in place.  Encourage to perform flexion of the feet while in bed and awake, verbalize understanding.  Encourage ambulation TID.

## 2019-03-08 NOTE — ASSESSMENT & PLAN NOTE
-Due to dehydration  -Patient has been unable to keep anything down for 2 days  -I will start IV fluids, patient will require significant IV fluids  -I have given IV fluid bolus, repeat until blood pressure is improved

## 2019-03-08 NOTE — ASSESSMENT & PLAN NOTE
-Causing significant abdominal and flank pain  -Also causing hydronephrosis  -Patient is going to procedure now, urology to place stenting  -Pain management with IV Dilaudid as well as IV Toradol

## 2019-03-08 NOTE — OR SURGEON
Immediate Post OP Note    PreOp Diagnosis: right ureteral stone, likely UTI    PostOp Diagnosis: same    Procedure(s):  CYSTOSCOPY STENT PLACEMENT    Surgeon(s):  Gus Tee M.D.    Anesthesiologist/Type of Anesthesia:  No anesthesia staff entered./* No anesthesia type entered *    Surgical Staff:  * No surgical staff found *    Specimens removed if any:  * No specimens in log *    Estimated Blood Loss: none    Findings: 26x6 stent.  Opaque stone.    Complications: none        3/7/2019 5:55 PM Gus Tee M.D.

## 2019-03-08 NOTE — ASSESSMENT & PLAN NOTE
-borderline UA from outside facility  -Repeat urinalysis here  -She has been given Rocephin  -Patient is not septic at this point, heart rates 82, respiratory rate 16 and temperature is 98.6, does have a significant elevation in white blood cell count, normal lactic acid  -I think she is at high risk for going into sepsis if that infected stone, will go ahead and start sepsis order set so that she gets the fluids from this but again at this point in time she is not septic  -Await culture results  -Await repeat urinalysis

## 2019-03-08 NOTE — PROGRESS NOTES
Discharging pt home per MD order. Discussed discharge instructions, follow up appointments, prescriptions, and home care for Cystoscopy. Pt voiding and ambulating without difficulty, pain controlled, tolerating diet. Family at bedside, all questions answered. Pt discharged off unit with hospital escort at 1432.

## 2019-03-09 NOTE — DISCHARGE SUMMARY
Discharge Summary    CHIEF COMPLAINT ON ADMISSION  Chief Complaint   Patient presents with   • Nephrolithiasis     Pt sent here from [facility] for an obstructive kidney stone on the right side.       Reason for Admission  Kidney Stone     Admission Date  3/7/2019    CODE STATUS  Prior    HPI & HOSPITAL COURSE  This is a 43 y.o. female here with abdominal pain. She was admitted with evidence of obstructive uropathy and related shock. She was seen by urology and underwent emergent laser lithotripsy and stent placement. Her cultures remained negative here. She improved much more quickly than expected after surgical intervention. She will be discharged home on empiric antibiotics and pain control. She will follow up with urology for stent removal.        Therefore, she is discharged in good and stable condition to home with close outpatient follow-up.    The patient recovered much more quickly than anticipated on admission.    Discharge Date  3/8/2019    FOLLOW UP ITEMS POST DISCHARGE  none    DISCHARGE DIAGNOSES  Active Problems:    Nephrolithiasis POA: Yes    Hydronephrosis POA: Yes    UTI (urinary tract infection) POA: Yes    Shock (HCC) POA: Yes    Tobacco abuse POA: Yes  Resolved Problems:    * No resolved hospital problems. *      FOLLOW UP  No future appointments.  Your Primary Care Provider  LACHELLE Pierre   Schedule an appointment as soon as possible for a visit in 1 week      Gus Tee M.D.  [address]  [place] [address]  [phone]      Follow up in 1-2 weeks       MEDICATIONS ON DISCHARGE     Medication List      START taking these medications      Instructions   ciprofloxacin 500 MG Tabs  Commonly known as:  CIPRO   Take 1 Tab by mouth 2 times a day for 7 days.  Dose:  500 mg     oxybutynin SR 10 MG CR tablet  Commonly known as:  DITROPAN-XL   Take 1 Tab by mouth every day for 7 days.  Dose:  10 mg     oxyCODONE immediate-release 5 MG Tabs  Commonly known as:  ROXICODONE   Take 2 Tabs  by mouth every 3 hours as needed for up to 7 days.  Dose:  10 mg     phenazopyridine 200 MG Tabs  Commonly known as:  PYRIDIUM   Take 1 Tab by mouth 3 times a day as needed for up to 7 days.  Dose:  200 mg        CONTINUE taking these medications      Instructions   ibuprofen 200 MG Tabs  Commonly known as:  MOTRIN   Take 400 mg by mouth every 6 hours as needed for Mild Pain.  Dose:  400 mg            Allergies  Allergies   Allergen Reactions   • Penicillins Unspecified     Childhood        DIET  No orders of the defined types were placed in this encounter.      ACTIVITY  As tolerated.  Weight bearing as tolerated    CONSULTATIONS  Gus Tee M.D.    PROCEDURES  PreOp Diagnosis: right ureteral stone, likely UTI     PostOp Diagnosis: same     Procedure(s):  CYSTOSCOPY STENT PLACEMENT     Surgeon(s):  Gus Tee M.D.     Anesthesiologist/Type of Anesthesia:  No anesthesia staff entered./* No anesthesia type entered *     Surgical Staff:  * No surgical staff found *     Specimens removed if any:  * No specimens in log *     Estimated Blood Loss: none     Findings: 26x6 stent.  Opaque stone.     Complications: none           3/7/2019 5:55 PM Gus Tee M.D.    LABORATORY  Lab Results   Component Value Date    SODIUM 135 03/08/2019    POTASSIUM 4.2 03/08/2019    CHLORIDE 103 03/08/2019    CO2 24 03/08/2019    GLUCOSE 133 (H) 03/08/2019    BUN 17 03/08/2019    CREATININE 1.17 03/08/2019        Lab Results   Component Value Date    WBC 19.3 (H) 03/08/2019    HEMOGLOBIN 11.6 (L) 03/08/2019    HEMATOCRIT 35.4 (L) 03/08/2019    PLATELETCT 231 03/08/2019        Total time of the discharge process exceeds 44 minutes.

## 2019-03-12 LAB
BACTERIA BLD CULT: NORMAL
BACTERIA BLD CULT: NORMAL
SIGNIFICANT IND 70042: NORMAL
SIGNIFICANT IND 70042: NORMAL
SITE SITE: NORMAL
SITE SITE: NORMAL
SOURCE SOURCE: NORMAL
SOURCE SOURCE: NORMAL

## 2020-06-03 ENCOUNTER — HOSPITAL ENCOUNTER (INPATIENT)
Facility: MEDICAL CENTER | Age: 45
LOS: 4 days | DRG: 659 | End: 2020-06-08
Attending: EMERGENCY MEDICINE | Admitting: HOSPITALIST
Payer: COMMERCIAL

## 2020-06-03 DIAGNOSIS — Z72.0 TOBACCO ABUSE: ICD-10-CM

## 2020-06-03 DIAGNOSIS — N12 PYELONEPHRITIS: ICD-10-CM

## 2020-06-03 DIAGNOSIS — A41.9 SEPSIS WITHOUT ACUTE ORGAN DYSFUNCTION, DUE TO UNSPECIFIED ORGANISM (HCC): Primary | ICD-10-CM

## 2020-06-03 PROCEDURE — 81025 URINE PREGNANCY TEST: CPT

## 2020-06-03 PROCEDURE — 36415 COLL VENOUS BLD VENIPUNCTURE: CPT

## 2020-06-03 PROCEDURE — 81001 URINALYSIS AUTO W/SCOPE: CPT

## 2020-06-03 PROCEDURE — 700111 HCHG RX REV CODE 636 W/ 250 OVERRIDE (IP): Performed by: EMERGENCY MEDICINE

## 2020-06-03 PROCEDURE — 87086 URINE CULTURE/COLONY COUNT: CPT

## 2020-06-03 PROCEDURE — 99285 EMERGENCY DEPT VISIT HI MDM: CPT

## 2020-06-03 PROCEDURE — 96365 THER/PROPH/DIAG IV INF INIT: CPT

## 2020-06-03 PROCEDURE — 94760 N-INVAS EAR/PLS OXIMETRY 1: CPT

## 2020-06-03 RX ORDER — MORPHINE SULFATE 4 MG/ML
4 INJECTION, SOLUTION INTRAMUSCULAR; INTRAVENOUS ONCE
Status: COMPLETED | OUTPATIENT
Start: 2020-06-04 | End: 2020-06-03

## 2020-06-03 RX ADMIN — MORPHINE SULFATE 4 MG: 4 INJECTION INTRAVENOUS at 23:55

## 2020-06-03 ASSESSMENT — FIBROSIS 4 INDEX: FIB4 SCORE: 0.91

## 2020-06-04 ENCOUNTER — APPOINTMENT (OUTPATIENT)
Dept: RADIOLOGY | Facility: MEDICAL CENTER | Age: 45
DRG: 659 | End: 2020-06-04
Attending: EMERGENCY MEDICINE
Payer: COMMERCIAL

## 2020-06-04 PROBLEM — A41.9 SEPSIS (HCC): Status: ACTIVE | Noted: 2020-06-04

## 2020-06-04 LAB
ALBUMIN SERPL BCP-MCNC: 3.8 G/DL (ref 3.2–4.9)
ALBUMIN/GLOB SERPL: 1.5 G/DL
ALP SERPL-CCNC: 107 U/L (ref 30–99)
ALT SERPL-CCNC: 13 U/L (ref 2–50)
ANION GAP SERPL CALC-SCNC: 10 MMOL/L (ref 7–16)
APPEARANCE UR: ABNORMAL
AST SERPL-CCNC: 13 U/L (ref 12–45)
BACTERIA #/AREA URNS HPF: ABNORMAL /HPF
BASOPHILS # BLD AUTO: 0.8 % (ref 0–1.8)
BASOPHILS # BLD: 0.09 K/UL (ref 0–0.12)
BILIRUB SERPL-MCNC: <0.2 MG/DL (ref 0.1–1.5)
BILIRUB UR QL STRIP.AUTO: NEGATIVE
BUN SERPL-MCNC: 18 MG/DL (ref 8–22)
CALCIUM SERPL-MCNC: 8.4 MG/DL (ref 8.4–10.2)
CHLORIDE SERPL-SCNC: 105 MMOL/L (ref 96–112)
CO2 SERPL-SCNC: 24 MMOL/L (ref 20–33)
COLOR UR: YELLOW
COVID ORDER STATUS COVID19: NORMAL
CREAT SERPL-MCNC: 1.08 MG/DL (ref 0.5–1.4)
EOSINOPHIL # BLD AUTO: 0.49 K/UL (ref 0–0.51)
EOSINOPHIL NFR BLD: 4.2 % (ref 0–6.9)
EPI CELLS #/AREA URNS HPF: ABNORMAL /HPF
ERYTHROCYTE [DISTWIDTH] IN BLOOD BY AUTOMATED COUNT: 43.3 FL (ref 35.9–50)
GLOBULIN SER CALC-MCNC: 2.6 G/DL (ref 1.9–3.5)
GLUCOSE SERPL-MCNC: 147 MG/DL (ref 65–99)
GLUCOSE UR STRIP.AUTO-MCNC: NEGATIVE MG/DL
HCG UR QL: NEGATIVE
HCT VFR BLD AUTO: 36.5 % (ref 37–47)
HGB BLD-MCNC: 11.7 G/DL (ref 12–16)
IMM GRANULOCYTES # BLD AUTO: 0.21 K/UL (ref 0–0.11)
IMM GRANULOCYTES NFR BLD AUTO: 1.8 % (ref 0–0.9)
INR PPP: 0.86 (ref 0.87–1.13)
KETONES UR STRIP.AUTO-MCNC: NEGATIVE MG/DL
LACTATE BLD-SCNC: 1.5 MMOL/L (ref 0.5–2)
LACTATE BLD-SCNC: 1.5 MMOL/L (ref 0.5–2)
LACTATE BLD-SCNC: 1.7 MMOL/L (ref 0.5–2)
LACTATE BLD-SCNC: 2.7 MMOL/L (ref 0.5–2)
LEUKOCYTE ESTERASE UR QL STRIP.AUTO: ABNORMAL
LYMPHOCYTES # BLD AUTO: 3.76 K/UL (ref 1–4.8)
LYMPHOCYTES NFR BLD: 32.2 % (ref 22–41)
MCH RBC QN AUTO: 25.7 PG (ref 27–33)
MCHC RBC AUTO-ENTMCNC: 32.1 G/DL (ref 33.6–35)
MCV RBC AUTO: 80.2 FL (ref 81.4–97.8)
MICRO URNS: ABNORMAL
MONOCYTES # BLD AUTO: 0.72 K/UL (ref 0–0.85)
MONOCYTES NFR BLD AUTO: 6.2 % (ref 0–13.4)
NEUTROPHILS # BLD AUTO: 6.41 K/UL (ref 2–7.15)
NEUTROPHILS NFR BLD: 54.8 % (ref 44–72)
NITRITE UR QL STRIP.AUTO: POSITIVE
NRBC # BLD AUTO: 0 K/UL
NRBC BLD-RTO: 0 /100 WBC
PH UR STRIP.AUTO: 7.5 [PH] (ref 5–8)
PLATELET # BLD AUTO: 381 K/UL (ref 164–446)
PMV BLD AUTO: 9.1 FL (ref 9–12.9)
POTASSIUM SERPL-SCNC: 4.1 MMOL/L (ref 3.6–5.5)
PROT SERPL-MCNC: 6.4 G/DL (ref 6–8.2)
PROT UR QL STRIP: 100 MG/DL
PROTHROMBIN TIME: 11.8 SEC (ref 12–14.6)
RBC # BLD AUTO: 4.55 M/UL (ref 4.2–5.4)
RBC # URNS HPF: ABNORMAL /HPF
RBC UR QL AUTO: ABNORMAL
SARS-COV-2 RNA RESP QL NAA+PROBE: NOTDETECTED
SODIUM SERPL-SCNC: 139 MMOL/L (ref 135–145)
SP GR UR STRIP.AUTO: 1.02
SPECIMEN SOURCE: NORMAL
WBC # BLD AUTO: 11.7 K/UL (ref 4.8–10.8)
WBC #/AREA URNS HPF: ABNORMAL /HPF

## 2020-06-04 PROCEDURE — 85610 PROTHROMBIN TIME: CPT

## 2020-06-04 PROCEDURE — 700111 HCHG RX REV CODE 636 W/ 250 OVERRIDE (IP): Performed by: EMERGENCY MEDICINE

## 2020-06-04 PROCEDURE — 700105 HCHG RX REV CODE 258: Performed by: EMERGENCY MEDICINE

## 2020-06-04 PROCEDURE — 85025 COMPLETE CBC W/AUTO DIFF WBC: CPT

## 2020-06-04 PROCEDURE — 770006 HCHG ROOM/CARE - MED/SURG/GYN SEMI*

## 2020-06-04 PROCEDURE — 83605 ASSAY OF LACTIC ACID: CPT | Mod: 91

## 2020-06-04 PROCEDURE — 700111 HCHG RX REV CODE 636 W/ 250 OVERRIDE (IP): Performed by: HOSPITALIST

## 2020-06-04 PROCEDURE — 700102 HCHG RX REV CODE 250 W/ 637 OVERRIDE(OP): Performed by: HOSPITALIST

## 2020-06-04 PROCEDURE — 76775 US EXAM ABDO BACK WALL LIM: CPT

## 2020-06-04 PROCEDURE — 36415 COLL VENOUS BLD VENIPUNCTURE: CPT

## 2020-06-04 PROCEDURE — C9803 HOPD COVID-19 SPEC COLLECT: HCPCS | Performed by: EMERGENCY MEDICINE

## 2020-06-04 PROCEDURE — 99223 1ST HOSP IP/OBS HIGH 75: CPT | Performed by: HOSPITALIST

## 2020-06-04 PROCEDURE — 96375 TX/PRO/DX INJ NEW DRUG ADDON: CPT

## 2020-06-04 PROCEDURE — 87040 BLOOD CULTURE FOR BACTERIA: CPT

## 2020-06-04 PROCEDURE — 700105 HCHG RX REV CODE 258: Performed by: HOSPITALIST

## 2020-06-04 PROCEDURE — U0004 COV-19 TEST NON-CDC HGH THRU: HCPCS

## 2020-06-04 PROCEDURE — 74018 RADEX ABDOMEN 1 VIEW: CPT

## 2020-06-04 PROCEDURE — A9270 NON-COVERED ITEM OR SERVICE: HCPCS | Performed by: HOSPITALIST

## 2020-06-04 PROCEDURE — 80053 COMPREHEN METABOLIC PANEL: CPT

## 2020-06-04 RX ORDER — HYDROMORPHONE HYDROCHLORIDE 1 MG/ML
0.5 INJECTION, SOLUTION INTRAMUSCULAR; INTRAVENOUS; SUBCUTANEOUS
Status: DISCONTINUED | OUTPATIENT
Start: 2020-06-04 | End: 2020-06-08 | Stop reason: HOSPADM

## 2020-06-04 RX ORDER — SODIUM CHLORIDE, SODIUM LACTATE, POTASSIUM CHLORIDE, AND CALCIUM CHLORIDE .6; .31; .03; .02 G/100ML; G/100ML; G/100ML; G/100ML
30 INJECTION, SOLUTION INTRAVENOUS
Status: DISCONTINUED | OUTPATIENT
Start: 2020-06-04 | End: 2020-06-08 | Stop reason: HOSPADM

## 2020-06-04 RX ORDER — SODIUM CHLORIDE, SODIUM LACTATE, POTASSIUM CHLORIDE, CALCIUM CHLORIDE 600; 310; 30; 20 MG/100ML; MG/100ML; MG/100ML; MG/100ML
INJECTION, SOLUTION INTRAVENOUS CONTINUOUS
Status: DISCONTINUED | OUTPATIENT
Start: 2020-06-04 | End: 2020-06-08 | Stop reason: HOSPADM

## 2020-06-04 RX ORDER — OXYCODONE HYDROCHLORIDE 10 MG/1
10 TABLET ORAL
Status: DISCONTINUED | OUTPATIENT
Start: 2020-06-04 | End: 2020-06-08 | Stop reason: HOSPADM

## 2020-06-04 RX ORDER — KETOROLAC TROMETHAMINE 30 MG/ML
15 INJECTION, SOLUTION INTRAMUSCULAR; INTRAVENOUS EVERY 6 HOURS PRN
Status: DISCONTINUED | OUTPATIENT
Start: 2020-06-04 | End: 2020-06-08 | Stop reason: HOSPADM

## 2020-06-04 RX ORDER — AMOXICILLIN 250 MG
2 CAPSULE ORAL 2 TIMES DAILY
Status: DISCONTINUED | OUTPATIENT
Start: 2020-06-04 | End: 2020-06-08 | Stop reason: HOSPADM

## 2020-06-04 RX ORDER — PROCHLORPERAZINE EDISYLATE 5 MG/ML
5-10 INJECTION INTRAMUSCULAR; INTRAVENOUS EVERY 4 HOURS PRN
Status: DISCONTINUED | OUTPATIENT
Start: 2020-06-04 | End: 2020-06-08 | Stop reason: HOSPADM

## 2020-06-04 RX ORDER — ACETAMINOPHEN 325 MG/1
650 TABLET ORAL EVERY 6 HOURS PRN
Status: DISCONTINUED | OUTPATIENT
Start: 2020-06-04 | End: 2020-06-08 | Stop reason: HOSPADM

## 2020-06-04 RX ORDER — ONDANSETRON 2 MG/ML
4 INJECTION INTRAMUSCULAR; INTRAVENOUS EVERY 4 HOURS PRN
Status: DISCONTINUED | OUTPATIENT
Start: 2020-06-04 | End: 2020-06-08 | Stop reason: HOSPADM

## 2020-06-04 RX ORDER — BISACODYL 10 MG
10 SUPPOSITORY, RECTAL RECTAL
Status: DISCONTINUED | OUTPATIENT
Start: 2020-06-04 | End: 2020-06-08 | Stop reason: HOSPADM

## 2020-06-04 RX ORDER — PROMETHAZINE HYDROCHLORIDE 25 MG/1
12.5-25 TABLET ORAL EVERY 4 HOURS PRN
Status: DISCONTINUED | OUTPATIENT
Start: 2020-06-04 | End: 2020-06-08 | Stop reason: HOSPADM

## 2020-06-04 RX ORDER — ENALAPRILAT 1.25 MG/ML
1.25 INJECTION INTRAVENOUS EVERY 6 HOURS PRN
Status: DISCONTINUED | OUTPATIENT
Start: 2020-06-04 | End: 2020-06-08 | Stop reason: HOSPADM

## 2020-06-04 RX ORDER — PROMETHAZINE HYDROCHLORIDE 25 MG/1
12.5-25 SUPPOSITORY RECTAL EVERY 4 HOURS PRN
Status: DISCONTINUED | OUTPATIENT
Start: 2020-06-04 | End: 2020-06-08 | Stop reason: HOSPADM

## 2020-06-04 RX ORDER — OXYCODONE HYDROCHLORIDE 5 MG/1
5 TABLET ORAL
Status: DISCONTINUED | OUTPATIENT
Start: 2020-06-04 | End: 2020-06-08 | Stop reason: HOSPADM

## 2020-06-04 RX ORDER — SODIUM CHLORIDE 9 MG/ML
1000 INJECTION, SOLUTION INTRAVENOUS ONCE
Status: COMPLETED | OUTPATIENT
Start: 2020-06-04 | End: 2020-06-04

## 2020-06-04 RX ORDER — POLYETHYLENE GLYCOL 3350 17 G/17G
1 POWDER, FOR SOLUTION ORAL
Status: DISCONTINUED | OUTPATIENT
Start: 2020-06-04 | End: 2020-06-08 | Stop reason: HOSPADM

## 2020-06-04 RX ORDER — ONDANSETRON 4 MG/1
4 TABLET, ORALLY DISINTEGRATING ORAL EVERY 4 HOURS PRN
Status: DISCONTINUED | OUTPATIENT
Start: 2020-06-04 | End: 2020-06-08 | Stop reason: HOSPADM

## 2020-06-04 RX ORDER — SODIUM CHLORIDE, SODIUM LACTATE, POTASSIUM CHLORIDE, AND CALCIUM CHLORIDE .6; .31; .03; .02 G/100ML; G/100ML; G/100ML; G/100ML
1000 INJECTION, SOLUTION INTRAVENOUS
Status: DISCONTINUED | OUTPATIENT
Start: 2020-06-04 | End: 2020-06-08 | Stop reason: HOSPADM

## 2020-06-04 RX ADMIN — OXYCODONE HYDROCHLORIDE 10 MG: 5 TABLET ORAL at 04:51

## 2020-06-04 RX ADMIN — SODIUM CHLORIDE, POTASSIUM CHLORIDE, SODIUM LACTATE AND CALCIUM CHLORIDE: 600; 310; 30; 20 INJECTION, SOLUTION INTRAVENOUS at 03:00

## 2020-06-04 RX ADMIN — DOCUSATE SODIUM - SENNOSIDES 2 TABLET: 50; 8.6 TABLET, FILM COATED ORAL at 04:52

## 2020-06-04 RX ADMIN — KETOROLAC TROMETHAMINE 15 MG: 30 INJECTION, SOLUTION INTRAMUSCULAR at 21:04

## 2020-06-04 RX ADMIN — SODIUM CHLORIDE, POTASSIUM CHLORIDE, SODIUM LACTATE AND CALCIUM CHLORIDE: 600; 310; 30; 20 INJECTION, SOLUTION INTRAVENOUS at 12:25

## 2020-06-04 RX ADMIN — CEFTRIAXONE SODIUM 2 G: 2 INJECTION, POWDER, FOR SOLUTION INTRAMUSCULAR; INTRAVENOUS at 00:44

## 2020-06-04 RX ADMIN — SODIUM CHLORIDE 1000 ML: 9 INJECTION, SOLUTION INTRAVENOUS at 00:44

## 2020-06-04 ASSESSMENT — ENCOUNTER SYMPTOMS
FEVER: 0
INSOMNIA: 0
NECK PAIN: 0
BRUISES/BLEEDS EASILY: 0
NAUSEA: 0
RESPIRATORY NEGATIVE: 1
COUGH: 0
SHORTNESS OF BREATH: 0
BLURRED VISION: 0
NERVOUS/ANXIOUS: 0
EYE PAIN: 0
WEIGHT LOSS: 0
FLANK PAIN: 1
DIZZINESS: 0
CHILLS: 0
TINGLING: 0
MYALGIAS: 0
BACK PAIN: 1
HEADACHES: 0
LOSS OF CONSCIOUSNESS: 0
PALPITATIONS: 0
DEPRESSION: 0
ABDOMINAL PAIN: 0
PSYCHIATRIC NEGATIVE: 1
SORE THROAT: 0
CONSTITUTIONAL NEGATIVE: 1
GASTROINTESTINAL NEGATIVE: 1
NEUROLOGICAL NEGATIVE: 1
BACK PAIN: 0
FEVER: 1
CHILLS: 1
CARDIOVASCULAR NEGATIVE: 1
VOMITING: 0
WEAKNESS: 0

## 2020-06-04 ASSESSMENT — COPD QUESTIONNAIRES
DO YOU EVER COUGH UP ANY MUCUS OR PHLEGM?: YES, A FEW DAYS A WEEK OR MONTH
DURING THE PAST 4 WEEKS HOW MUCH DID YOU FEEL SHORT OF BREATH: SOME OF THE TIME
COPD SCREENING SCORE: 5
HAVE YOU SMOKED AT LEAST 100 CIGARETTES IN YOUR ENTIRE LIFE: YES
IN THE PAST 12 MONTHS DO YOU DO LESS THAN YOU USED TO BECAUSE OF YOUR BREATHING PROBLEMS: AGREE

## 2020-06-04 ASSESSMENT — LIFESTYLE VARIABLES
TOTAL SCORE: 0
SUBSTANCE_ABUSE: 0
EVER HAD A DRINK FIRST THING IN THE MORNING TO STEADY YOUR NERVES TO GET RID OF A HANGOVER: NO
HAVE PEOPLE ANNOYED YOU BY CRITICIZING YOUR DRINKING: NO
HAVE YOU EVER FELT YOU SHOULD CUT DOWN ON YOUR DRINKING: NO
EVER_SMOKED: YES
ALCOHOL_USE: NO
CONSUMPTION TOTAL: NEGATIVE
AVERAGE NUMBER OF DAYS PER WEEK YOU HAVE A DRINK CONTAINING ALCOHOL: 0
TOTAL SCORE: 0
EVER FELT BAD OR GUILTY ABOUT YOUR DRINKING: NO
HOW MANY TIMES IN THE PAST YEAR HAVE YOU HAD 5 OR MORE DRINKS IN A DAY: 0
ON A TYPICAL DAY WHEN YOU DRINK ALCOHOL HOW MANY DRINKS DO YOU HAVE: 0
TOTAL SCORE: 0

## 2020-06-04 ASSESSMENT — COGNITIVE AND FUNCTIONAL STATUS - GENERAL
MOBILITY SCORE: 18
MOVING FROM LYING ON BACK TO SITTING ON SIDE OF FLAT BED: A LITTLE
WALKING IN HOSPITAL ROOM: A LITTLE
STANDING UP FROM CHAIR USING ARMS: A LITTLE
SUGGESTED CMS G CODE MODIFIER MOBILITY: CK
DAILY ACTIVITIY SCORE: 20
MOVING TO AND FROM BED TO CHAIR: A LITTLE
HELP NEEDED FOR BATHING: A LITTLE
SUGGESTED CMS G CODE MODIFIER DAILY ACTIVITY: CJ
TURNING FROM BACK TO SIDE WHILE IN FLAT BAD: A LITTLE
TOILETING: A LITTLE
DRESSING REGULAR LOWER BODY CLOTHING: A LITTLE
DRESSING REGULAR UPPER BODY CLOTHING: A LITTLE
CLIMB 3 TO 5 STEPS WITH RAILING: A LITTLE

## 2020-06-04 ASSESSMENT — PATIENT HEALTH QUESTIONNAIRE - PHQ9
1. LITTLE INTEREST OR PLEASURE IN DOING THINGS: NOT AT ALL
SUM OF ALL RESPONSES TO PHQ9 QUESTIONS 1 AND 2: 0
2. FEELING DOWN, DEPRESSED, IRRITABLE, OR HOPELESS: NOT AT ALL

## 2020-06-04 ASSESSMENT — FIBROSIS 4 INDEX
FIB4 SCORE: 0.44
FIB4 SCORE: 0.44

## 2020-06-04 NOTE — ASSESSMENT & PLAN NOTE
With sepsis, due to indwelling stent present over a year. With right sided severe hydronephrosis.   Urinalysis positive with negative to date cultures. Blood cultures negative to date.   Continue ceftriaxone 2 grams IV every 24 hours.  S/p laser litho and stent replacement with urology

## 2020-06-04 NOTE — ASSESSMENT & PLAN NOTE
This is Sepsis Present on admission  SIRS criteria identified on my evaluation include: Tachycardia, with heart rate greater than 90 BPM  Source is UTI.  Sepsis protocol initiated  Fluid resuscitation ordered per protocol  IV antibiotics as appropriate for source of sepsis  While organ dysfunction may be noted elsewhere in this problem list or in the chart, degree of organ dysfunction does not meet CMS criteria for severe sepsis

## 2020-06-04 NOTE — PROGRESS NOTES
Report received from night shift RN. Assume care. Pt. AAOx4 pt is bed,  Assessment completed. VSS. Denies abd pain at this time. Pt is fully ambulatory with steady gait.Pt was update for the care for the day. White board updated, All question answered. Pt has call light within reach,  bed is in the lowest position. Pt has no other needs at this time.     1640 Paged Dr Brayan Schilling to find out POC  1700 Dr Schilling called Pt go back to Regular diet and NPO at midnight. Pt to get procedure done tomorrow

## 2020-06-04 NOTE — DISCHARGE PLANNING
SW attempted to see patient, she is sleeping soundly. SW completed chart review.     Patient is a 44-year old women who lives in [place], [place]. Patient apparently drove here to see [facility] [place] and now is in the hospital for R flank pain. Patient has [employer] insurance.     SW to follow up on PCP and other providers. No issues noted with ADLs or IADLS. No home O2 noted, no hospital O2. Pharmacy is [facility] in [place]. No issues with drugs, alcohol, or mental health noted.     PLAN: SW to follow up with patient to fill in facesheet and to assist with D/C needs.     Care Transition Team Assessment    Information Source  Information Given By: Patient  Who is responsible for making decisions for patient? : Patient    Readmission Evaluation  Is this a readmission?: No    Elopement Risk  Legal Hold: No  Ambulatory or Self Mobile in Wheelchair: Yes  Disoriented: No  Psychiatric Symptoms: None  History of Wandering: No  Elopement this Admit: No  Vocalizing Wanting to Leave: No  Displays Behaviors, Body Language Wanting to Leave: No-Not at Risk for Elopement    Interdisciplinary Discharge Planning  Patient or legal guardian wants to designate a caregiver (see row info): No    Discharge Preparedness  What is your plan after discharge?: Home with help  What are your discharge supports?: Other (comment)(Friend)  Prior Functional Level: Ambulatory, Drives Self, Independent with Activities of Daily Living, Independent with Medication Management  Difficulity with ADLs: None  Difficulity with IADLs: None    Functional Assesment  Prior Functional Level: Ambulatory, Drives Self, Independent with Activities of Daily Living, Independent with Medication Management    Finances  Financial Barriers to Discharge: No  Prescription Coverage: Yes([place] only)    Vision / Hearing Impairment  Vision Impairment : Yes  Right Eye Vision: Impaired, Wears Glasses  Left Eye Vision: Impaired, Wears Glasses  Hearing Impairment : No         Advance  Directive  Advance Directive?: None    Domestic Abuse  Have you ever been the victim of abuse or violence?: No  Physical Abuse or Sexual Abuse: No  Verbal Abuse or Emotional Abuse: No  Possible Abuse Reported to:: Not Applicable    Psychological Assessment  Non-compliant with Treatment: No  Newly Diagnosed Illness: No    Discharge Risks or Barriers  Discharge risks or barriers?: No    Anticipated Discharge Information  Anticipated discharge disposition: Home  Discharge Address: [address]  Discharge Contact Phone Number: [phone]

## 2020-06-04 NOTE — CARE PLAN
Problem: Communication  Goal: The ability to communicate needs accurately and effectively will improve  Outcome: PROGRESSING AS EXPECTED     Problem: Safety  Goal: Will remain free from injury  Outcome: PROGRESSING AS EXPECTED     Problem: Bowel/Gastric:  Goal: Will not experience complications related to bowel motility  Outcome: PROGRESSING AS EXPECTED     Problem: Knowledge Deficit  Goal: Knowledge of disease process/condition, treatment plan, diagnostic tests, and medications will improve  Outcome: PROGRESSING AS EXPECTED  Goal: Knowledge of the prescribed therapeutic regimen will improve  Outcome: PROGRESSING AS EXPECTED     Problem: Pain Management  Goal: Pain level will decrease to patient's comfort goal  Outcome: PROGRESSING AS EXPECTED     Problem: Respiratory:  Goal: Respiratory status will improve  Outcome: PROGRESSING AS EXPECTED

## 2020-06-04 NOTE — H&P
Hospital Medicine History & Physical Note    Date of Service  6/4/2020    Primary Care Physician  No primary care provider on file.    Code Status  Full Code    Chief Complaint  Flank pain.    History of Presenting Illness  44 y.o. female who presented 6/3/2020 with right flank pain, dysuria and urinary frequency. In March 2019 she had a laser lithotripsy and stent placement done by Dr. Tee. At that time she had shock and sepsis. After this she moved to [place] and was unable to find a medi d[employer] provider to remove the stent. In the interim she has been getting treated repeatedly for UTI which return. Finally she has come here from her home in [place] to be seen by [facility] [place].    I discussed the presenting symptoms, physical examination, lab and radiological study results with the emergency department physician.    Review of Systems  Review of Systems   Constitutional: Negative.  Negative for chills, fever, malaise/fatigue and weight loss.   HENT: Negative.    Respiratory: Negative.  Negative for cough and shortness of breath.    Cardiovascular: Negative.  Negative for chest pain and leg swelling.   Gastrointestinal: Negative.  Negative for abdominal pain, nausea and vomiting.   Genitourinary: Positive for flank pain. Negative for dysuria.   Musculoskeletal: Negative for back pain and myalgias.   Neurological: Negative.  Negative for dizziness, loss of consciousness and weakness.   Endo/Heme/Allergies: Negative.  Does not bruise/bleed easily.   Psychiatric/Behavioral: Negative.  Negative for depression. The patient is not nervous/anxious.    All other systems reviewed and are negative.      Past Medical History   has a past medical history of Renal disorder.    Surgical History   has a past surgical history that includes tubal coagulation laparoscopic bilateral (1995) and cystoscopy stent placement (Right, 3/7/2019).     Family History  Positive for kidney stones.     Social History   reports that she  has been smoking cigarettes. She has been smoking about 0.50 packs per day. She has never used smokeless tobacco. She reports current drug use. She reports that she does not drink alcohol.    Allergies  Allergies   Allergen Reactions   • Penicillins Unspecified     Childhood        Medications  Prior to Admission Medications   Prescriptions Last Dose Informant Patient Reported? Taking?   ibuprofen (MOTRIN) 200 MG Tab 6/3/2020 at 0800 Patient Yes No   Sig: Take 400 mg by mouth every 6 hours as needed for Mild Pain.      Facility-Administered Medications: None       Physical Exam  Temp:  [36.3 °C (97.3 °F)] 36.3 °C (97.3 °F)  Pulse:  [87-95] 87  Resp:  [19-20] 20  BP: (117-141)/(66-77) 117/66  SpO2:  [95 %-96 %] 95 %    Physical Exam  Vitals signs and nursing note reviewed.   Constitutional:       General: She is not in acute distress.     Appearance: She is well-developed. She is not diaphoretic.   HENT:      Right Ear: External ear normal.      Left Ear: External ear normal.      Nose: Nose normal.      Mouth/Throat:      Pharynx: No oropharyngeal exudate.   Eyes:      General: No scleral icterus.        Right eye: No discharge.         Left eye: No discharge.      Conjunctiva/sclera: Conjunctivae normal.   Neck:      Vascular: No JVD.      Trachea: No tracheal deviation.   Cardiovascular:      Rate and Rhythm: Normal rate and regular rhythm.      Heart sounds: Normal heart sounds.   Pulmonary:      Effort: Pulmonary effort is normal. No respiratory distress.      Breath sounds: Normal breath sounds. No stridor. No wheezing or rales.   Chest:      Chest wall: No tenderness.   Abdominal:      General: Bowel sounds are normal. There is no distension.      Palpations: Abdomen is soft.      Tenderness: There is no abdominal tenderness. There is right CVA tenderness.   Musculoskeletal:         General: No tenderness.   Skin:     General: Skin is warm and dry.      Coloration: Skin is not pale.   Neurological:      Mental  Status: She is alert.      Cranial Nerves: No cranial nerve deficit.      Motor: No abnormal muscle tone.   Psychiatric:         Mood and Affect: Mood normal.         Behavior: Behavior normal.         Laboratory:  Recent Labs     06/04/20  0000   WBC 11.7*   RBC 4.55   HEMOGLOBIN 11.7*   HEMATOCRIT 36.5*   MCV 80.2*   MCH 25.7*   MCHC 32.1*   RDW 43.3   PLATELETCT 381   MPV 9.1     Recent Labs     06/04/20  0000   SODIUM 139   POTASSIUM 4.1   CHLORIDE 105   CO2 24   GLUCOSE 147*   BUN 18   CREATININE 1.08   CALCIUM 8.4     Recent Labs     06/04/20  0000   ALTSGPT 13   ASTSGOT 13   ALKPHOSPHAT 107*   TBILIRUBIN <0.2   GLUCOSE 147*     Recent Labs     06/04/20  0000   INR 0.86*     No results for input(s): NTPROBNP in the last 72 hours.      No results for input(s): TROPONINT in the last 72 hours.    Imaging:  [id number] VIEW   Final Result         1.  Moderate stool in the colon suggests changes of constipation, otherwise nonspecific bowel gas pattern   2.  Nephrolithiasis   3.  Bladder stones along the urinary stent.      US-RENAL   Final Result         1.  Moderate to severe right hydronephrosis and calyceal dilatation   2.  Echogenic liver compatible fatty change versus fibrosis.            Assessment/Plan:      * Complicated UTI (urinary tract infection)- (present on admission)  Assessment & Plan  With sepsis, due to indwelling stent present over a year.  Urinalysis sent with culture.  Start ceftriaxone 2 grams IV every 24 hours.  Needs to have stent removed, urology consulted and will remove stent in am.      Sepsis (HCC)- (present on admission)  Assessment & Plan  This is Sepsis Present on admission  SIRS criteria identified on my evaluation include: Tachycardia, with heart rate greater than 90 BPM  Source is UTI.  Sepsis protocol initiated  Fluid resuscitation ordered per protocol  IV antibiotics as appropriate for source of sepsis  While organ dysfunction may be noted elsewhere in this problem list or in  the chart, degree of organ dysfunction does not meet CMS criteria for severe sepsis          Nephrolithiasis- (present on admission)  Assessment & Plan  History of stent one year ago and lost to follow up in [place] to get it removed.  Evaluation by urology pending and going to OR for removal.    Hydronephrosis- (present on admission)  Assessment & Plan  Due to stent in place for prolonged amount of time.  NPO for surgery, urology to consult and take patient to OR for stent removal.

## 2020-06-04 NOTE — CONSULTS
[facility] [place] Consult/H&P Note    Primary Service: Hospitalist  Attending: Dragan Kidd M.D.  Patient's Name/MRN: Mariana Aguero, [id number]    Admit Date:6/3/2020  Today's Date: 6/4/2020   Length of stay:  LOS: 0 days   Room #: 2201/02      Reason for consult/chief complaint: residual stent causing right flank pain and dysuria   ID/HPI: Mariana Aguero is a 44 y.o. female patient presented to the ED on 6/3 with right flank pain and dysuria. Imaging showed a ureteral stent with bladder stones located alongside. Stent was placed by Dr. Tee 15 mo ago, but she then moved to [place] and had difficulty with insurance preventing her f/u for stent removal. Pain then became too unbearable, forcing her to drive from [place] for treatment. Afebrile. VSS. WBC 11.7, Cr 1.08.        Past Medical History:   Past Medical History:   Diagnosis Date   • Renal disorder     Kidney Stone (2019);        Past Surgical History:   Past Surgical History:   Procedure Laterality Date   • CYSTOSCOPY STENT PLACEMENT Right 3/7/2019    Procedure: CYSTOSCOPY STENT PLACEMENT;  Surgeon: Gus Tee M.D.;  Location: SURGERY [facility];  Service: Urology   • TUBAL COAGULATION LAPAROSCOPIC BILATERAL  1995        Family History:   History reviewed. No pertinent family history.      Social History:   Social History     Tobacco Use   • Smoking status: Current Every Day Smoker     Packs/day: 0.50     Types: Cigarettes   • Smokeless tobacco: Never Used   Substance Use Topics   • Alcohol use: No   • Drug use: Yes     Comment: THC;      Social History     Social History Narrative   • Not on file        Allergies: she Penicillins    Medications:   Medications Prior to Admission   Medication Sig Dispense Refill Last Dose   • ibuprofen (MOTRIN) 200 MG Tab Take 400 mg by mouth every 6 hours as needed for Mild Pain.   6/3/2020 at 0800         Review of Systems  Review of Systems   Constitutional: Negative for chills and fever.  "  Gastrointestinal: Negative for nausea and vomiting.   Genitourinary: Positive for dysuria, flank pain (right) and frequency. Negative for hematuria.   All other systems reviewed and are negative.       Physical Exam  VITAL SIGNS: /63   Pulse 73   Temp 36.5 °C (97.7 °F) (Oral)   Resp 18   Ht 1.626 m (5' 4.02\")   Wt 83 kg (182 lb 15.7 oz)   LMP 05/01/2020 (Exact Date)   SpO2 92%   Breastfeeding No   BMI 31.39 kg/m²   Physical Exam   Constitutional: She is oriented to person, place, and time and well-developed, well-nourished, and in no distress.   HENT:   Head: Normocephalic and atraumatic.   Eyes: Pupils are equal, round, and reactive to light.   Neck: Normal range of motion.   Cardiovascular: Normal rate.   Pulmonary/Chest: Effort normal.   Abdominal: Soft. She exhibits no distension.   Musculoskeletal: Normal range of motion.   Neurological: She is alert and oriented to person, place, and time.   Skin: Skin is warm and dry. She is not diaphoretic.   Nursing note and vitals reviewed.        Labs:  Recent Labs     06/04/20  0000   WBC 11.7*   RBC 4.55   HEMOGLOBIN 11.7*   HEMATOCRIT 36.5*   MCV 80.2*   MCH 25.7*   MCHC 32.1*   RDW 43.3   PLATELETCT 381   MPV 9.1     Recent Labs     06/04/20  0000   SODIUM 139   POTASSIUM 4.1   CHLORIDE 105   CO2 24   GLUCOSE 147*   BUN 18   CREATININE 1.08   CALCIUM 8.4     Recent Labs     06/04/20  0000   INR 0.86*     Glucose:  Recent Labs     06/04/20  0000   GLUCOSE 147*     Coags:  Recent Labs     06/04/20  0000   INR 0.86*         Urinalysis:   Recent Labs     06/03/20  2345   COLORURINE Yellow   CLARITY Cloudy*   SPECGRAVITY 1.020   PHURINE 7.5   GLUCOSEUR Negative   KETONES Negative   NITRITE Positive*   OCCULTBLOOD Large*   RBCURINE 5-10*   BACTERIA Moderate*   EPITHELCELL Few       Imaging:                                                                                        Assessment/Recommendation   44 y.o.female with right stent placed 15 mo ago " presented to ED with R flank pain and dysuria.     · PLAN:   · Dr. Pitt to review films and plan to OR for stent removal either 6/4 or 6/5.   · Dr. Pitt is aware of this consult and dictated the plan of care. Call with any questions.        Lawanda Rodriguez, PBRIGID.   [address]  [place] [address]   [phone]

## 2020-06-04 NOTE — PROGRESS NOTES
[facility] Medicine Daily Progress Note    Date of Service  6/4/2020    Chief Complaint  44 y.o. female admitted 6/3/2020 with flank pain.     Hospital Course    She was found to have a complicated UTI with sepsis. This was due to an indwelling stent and sanchez with right sided hydronephrosis. She was placed onto IV antibiotics and sepsis protocols. Urology was emergently consulted for stent removal.       Interval Problem Update  Vitals stable over night. Lactic acid improving. I reviewed her imaging below.     KUB  1.  Moderate stool in the colon suggests changes of constipation, otherwise nonspecific bowel gas pattern  2.  Nephrolithiasis  3.  Bladder stones along the urinary stent.    US renal  1.  Moderate to severe right hydronephrosis and calyceal dilatation  2.  Echogenic liver compatible fatty change versus fibrosis.    Prolonged services from 940-1013am today.     Consultants/Specialty  urology    Code Status  full    Disposition  tbd    Review of Systems  Review of Systems   Constitutional: Positive for chills, fever and malaise/fatigue.   HENT: Negative for sore throat.    Eyes: Negative for blurred vision and pain.   Respiratory: Negative for cough and shortness of breath.    Cardiovascular: Negative for chest pain and palpitations.   Gastrointestinal: Negative for abdominal pain, nausea and vomiting.   Genitourinary: Positive for flank pain. Negative for dysuria and urgency.   Musculoskeletal: Negative for back pain and neck pain.   Skin: Negative for itching and rash.   Neurological: Negative for dizziness, tingling and headaches.   Psychiatric/Behavioral: Negative for depression. The patient does not have insomnia.    All other systems reviewed and are negative.       Physical Exam  Temp:  [36.3 °C (97.3 °F)-36.9 °C (98.4 °F)] 36.9 °C (98.4 °F)  Pulse:  [82-95] 85  Resp:  [16-20] 18  BP: (117-150)/(61-77) 150/70  SpO2:  [94 %-97 %] 97 %    Physical Exam  Vitals signs and nursing note reviewed.    Constitutional:       General: She is not in acute distress.     Appearance: She is well-developed. She is not diaphoretic.   HENT:      Right Ear: External ear normal.      Left Ear: External ear normal.      Nose: Nose normal.   Eyes:      General:         Right eye: No discharge.         Left eye: No discharge.      Conjunctiva/sclera: Conjunctivae normal.   Neck:      Vascular: No JVD.   Cardiovascular:      Rate and Rhythm: Regular rhythm.      Heart sounds: Normal heart sounds. No murmur.   Pulmonary:      Effort: Pulmonary effort is normal. No respiratory distress.      Breath sounds: Normal breath sounds. No stridor. No wheezing or rales.   Abdominal:      General: Bowel sounds are normal. There is no distension.      Palpations: Abdomen is soft.      Tenderness: There is abdominal tenderness.   Musculoskeletal:         General: No tenderness.   Skin:     General: Skin is warm and dry.      Findings: No erythema.   Neurological:      Mental Status: She is alert and oriented to person, place, and time.   Psychiatric:         Behavior: Behavior normal.         Fluids    Intake/Output Summary (Last 24 hours) at 6/4/2020 1006  Last data filed at 6/4/2020 0800  Gross per 24 hour   Intake 2275 ml   Output 200 ml   Net 2075 ml       Laboratory  Recent Labs     06/04/20  0000   WBC 11.7*   RBC 4.55   HEMOGLOBIN 11.7*   HEMATOCRIT 36.5*   MCV 80.2*   MCH 25.7*   MCHC 32.1*   RDW 43.3   PLATELETCT 381   MPV 9.1     Recent Labs     06/04/20  0000   SODIUM 139   POTASSIUM 4.1   CHLORIDE 105   CO2 24   GLUCOSE 147*   BUN 18   CREATININE 1.08   CALCIUM 8.4     Recent Labs     06/04/20  0000   INR 0.86*               Imaging  [id number] VIEW   Final Result         1.  Moderate stool in the colon suggests changes of constipation, otherwise nonspecific bowel gas pattern   2.  Nephrolithiasis   3.  Bladder stones along the urinary stent.      US-RENAL   Final Result         1.  Moderate to severe right hydronephrosis and  calyceal dilatation   2.  Echogenic liver compatible fatty change versus fibrosis.           Assessment/Plan  * Complicated UTI (urinary tract infection)- (present on admission)  Assessment & Plan  With sepsis, due to indwelling stent present over a year. With right sided severe hydronephrosis.   Urinalysis positive with pending culture.  Continue ceftriaxone 2 grams IV every 24 hours.  Needs to have stent removed, urology consulted. pending opinion and intervention.       Sepsis (HCC)- (present on admission)  Assessment & Plan  This is Sepsis Present on admission  SIRS criteria identified on my evaluation include: Tachycardia, with heart rate greater than 90 BPM  Source is UTI.  Sepsis protocol initiated  Fluid resuscitation ordered per protocol  IV antibiotics as appropriate for source of sepsis  While organ dysfunction may be noted elsewhere in this problem list or in the chart, degree of organ dysfunction does not meet CMS criteria for severe sepsis          Nephrolithiasis- (present on admission)  Assessment & Plan  History of stent one year ago and lost to follow up in [place] to get it removed.  Evaluation by urology pending and going to OR for removal.    Hydronephrosis- (present on admission)  Assessment & Plan  Due to stent in place for prolonged amount of time.  NPO for surgery, urology to consult and take patient to OR for stent removal.       VTE prophylaxis: scd

## 2020-06-04 NOTE — ED PROVIDER NOTES
ED Provider Note     6/3/2020  11:43 PM    Means of Arrival: Walk In  History obtained by: patient  Limitations: none    CHIEF COMPLAINT  Right flank pain    HPI  Mariana Aguero is a 44 y.o. female who presents right-sided flank pain and pain with urination.  She says this has been ongoing intermittent for the past several weeks.  She reports throbbing and stinging pain.  No vomiting.  No fevers.  She was admitted March 7, 2019 for obstructive uropathy and shock.  She had emergent laser lithotripsy and stent placement.  Procedure was done by Dr. Tee.  Following the procedure she moved to [facility].  Over the past year she has had difficulty getting insurance and finding a provider to care for her.  Because the pain has been worse recently she called the [place] [facility] and was told that she would need to go to the emergency department if her symptoms worsened.  They would not be able to schedule removal of the stent outpatient.  That call was made 3 weeks ago.  This evening she was unable to take the pain any further, she has driven here from [place], [place].    REVIEW OF SYSTEMS  Review of Systems   Constitutional: Negative for chills and fever.   HENT: Negative for congestion.    Respiratory: Negative for cough and shortness of breath.    Cardiovascular: Negative for chest pain.   Gastrointestinal: Negative for abdominal pain, nausea and vomiting.   Genitourinary: Positive for dysuria, frequency and hematuria.   Musculoskeletal: Positive for back pain. Negative for neck pain.   Neurological: Negative for headaches.   Psychiatric/Behavioral: Negative for substance abuse.   All other systems reviewed and are negative.    See HPI for further details.    PAST MEDICAL HISTORY   has a past medical history of Renal disorder.Renal stones    SOCIAL HISTORY  Social History     Tobacco Use   • Smoking status: Current Every Day Smoker     Packs/day: 0.50     Types: Cigarettes   • Smokeless tobacco:  "Never Used   Substance and Sexual Activity   • Alcohol use: No   • Drug use: Yes     Comment: THC;   • Sexual activity: Not on file       SURGICAL HISTORY   has a past surgical history that includes tubal coagulation laparoscopic bilateral (1995) and cystoscopy stent placement (Right, 3/7/2019).    CURRENT MEDICATIONS  Home Medications     Reviewed by Gus Kang R.N. (Registered Nurse) on 06/04/20 at 0143  Med List Status: Complete   Medication Last Dose Status   ibuprofen (MOTRIN) 200 MG Tab 6/3/2020 Active                ALLERGIES  Allergies   Allergen Reactions   • Penicillins Unspecified     Childhood        PHYSICAL EXAM  VITAL SIGNS: /71   Pulse 82   Temp 36.3 °C (97.3 °F) (Temporal)   Resp 20   Ht 1.626 m (5' 4\")   Wt 83.1 kg (183 lb 3.2 oz)   SpO2 96%   Breastfeeding No   BMI 31.45 kg/m²     Pulse ox interpretation: I interpret this pulse ox as normal.  Constitutional: Alert in no apparent distress.  HENT: No signs of trauma, Bilateral external ears normal, Nose normal.   Eyes: Pupils are equal, Conjunctiva normal, Non-icteric.   Neck: Normal range of motion, No tenderness, Supple, No stridor.   Cardiovascular: rate in 90s.. Symmetric distal pulses. No cyanosis of extremities. No peripheral edema of extremities.  Thorax & Lungs: Normal breath sounds, No respiratory distress  Abdomen:Soft, No tenderness, No masses, No pulsatile masses. No peritoneal signs.  Skin: Warm, Dry, No erythema, No rash.   Back: No midline bony tenderness, Right CVA tenderness.   Musculoskeletal: Good range of motion in all major joints. No tenderness to palpation or major deformities noted.   Neurologic: Alert , Normal motor function, Normal sensory function, No focal deficits noted.   Psychiatric: Affect normal, Judgment normal, Mood normal.   Physical Exam      DIAGNOSTIC STUDIES / PROCEDURES    LABS  Pertinent Labs & Imaging studies reviewed. (See chart for details)    RADIOLOGY  Pertinent Labs & Imaging " studies reviewed. (See chart for details)    COURSE & MEDICAL DECISION MAKING  Pertinent Labs & Imaging studies reviewed. (See chart for details)    11:43 PM This is an emergent evaluation of a  44 y.o. female who presents with persistent right-sided flank pain.  She has had a right ureteral stent for over 1 year.  Her vitals are within normal limits.  She will be evaluated for infection, obstruction.  Her pain will be treated with morphine IV.  Anticipate once results back I will contact urology regarding stent.    12:20 AM  WBC 11.7. Nitrites, >100 wbcs, bacteria, blood in UA. HR >90. Consistent with UTI. Will give rocephin 2g IV. I have paged Dr. Jose A Schilling regarding UTI with old ureteral stent.     12:43 AM  I have spoken with Dr. Jose A Schilling.  He agrees with my plan for admission treatment of UTI and she will need a procedure to remove stent the possible treatment of stone.  I have ordered a KUB and ultrasound to look for hydronephrosis and signs of residual stone.  Dr. Kidd, hospitalist, has agreed to evaluated and arrange for hospitalization.     US shows hydronephrosis. Bladder stones along stent seen on KUB.     CRITICAL CARE  The very real possibilty of a deterioration of this patient's condition required the highest level of my preparedness for sudden, emergent intervention.  I provided critical care services, which included medication orders, frequent reevaluations of the patient's condition and response to treatment, ordering and reviewing test results, and discussing the case with various consultants.  The critical care time associated with the care of the patient was 35 minutes. Review chart for interventions. This time is exclusive of any other billable procedures.        FINAL IMPRESSION    ICD-10-CM   1. Sepsis without acute organ dysfunction, due to unspecified organism ([facility])  A41.9   2. Pyelonephritis  N12        This dictation was created using voice recognition software. The accuracy of the  dictation is limited to the abilities of the software. I expect there may be some errors of grammar and possibly content. The nursing notes were reviewed and certain aspects of this information were incorporated into this note.    Electronically signed by: Eitan Harding II, M.D., 6/3/2020 11:43 PM

## 2020-06-05 ENCOUNTER — ANESTHESIA (OUTPATIENT)
Dept: SURGERY | Facility: MEDICAL CENTER | Age: 45
DRG: 659 | End: 2020-06-05
Payer: COMMERCIAL

## 2020-06-05 ENCOUNTER — ANESTHESIA EVENT (OUTPATIENT)
Dept: SURGERY | Facility: MEDICAL CENTER | Age: 45
DRG: 659 | End: 2020-06-05
Payer: COMMERCIAL

## 2020-06-05 ENCOUNTER — APPOINTMENT (OUTPATIENT)
Dept: RADIOLOGY | Facility: MEDICAL CENTER | Age: 45
DRG: 659 | End: 2020-06-05
Attending: UROLOGY
Payer: COMMERCIAL

## 2020-06-05 LAB
ALBUMIN SERPL BCP-MCNC: 3.5 G/DL (ref 3.2–4.9)
ALBUMIN/GLOB SERPL: 1.3 G/DL
ALP SERPL-CCNC: 84 U/L (ref 30–99)
ALT SERPL-CCNC: 11 U/L (ref 2–50)
ANION GAP SERPL CALC-SCNC: 10 MMOL/L (ref 7–16)
AST SERPL-CCNC: 14 U/L (ref 12–45)
BASOPHILS # BLD AUTO: 0.7 % (ref 0–1.8)
BASOPHILS # BLD: 0.06 K/UL (ref 0–0.12)
BILIRUB SERPL-MCNC: <0.2 MG/DL (ref 0.1–1.5)
BUN SERPL-MCNC: 12 MG/DL (ref 8–22)
CALCIUM SERPL-MCNC: 8.1 MG/DL (ref 8.4–10.2)
CHLORIDE SERPL-SCNC: 105 MMOL/L (ref 96–112)
CO2 SERPL-SCNC: 25 MMOL/L (ref 20–33)
CREAT SERPL-MCNC: 0.8 MG/DL (ref 0.5–1.4)
EOSINOPHIL # BLD AUTO: 0.49 K/UL (ref 0–0.51)
EOSINOPHIL NFR BLD: 5.9 % (ref 0–6.9)
ERYTHROCYTE [DISTWIDTH] IN BLOOD BY AUTOMATED COUNT: 43.3 FL (ref 35.9–50)
GLOBULIN SER CALC-MCNC: 2.7 G/DL (ref 1.9–3.5)
GLUCOSE SERPL-MCNC: 92 MG/DL (ref 65–99)
HCT VFR BLD AUTO: 37.6 % (ref 37–47)
HGB BLD-MCNC: 11.7 G/DL (ref 12–16)
IMM GRANULOCYTES # BLD AUTO: 0.13 K/UL (ref 0–0.11)
IMM GRANULOCYTES NFR BLD AUTO: 1.6 % (ref 0–0.9)
LYMPHOCYTES # BLD AUTO: 2.96 K/UL (ref 1–4.8)
LYMPHOCYTES NFR BLD: 35.5 % (ref 22–41)
MCH RBC QN AUTO: 25.3 PG (ref 27–33)
MCHC RBC AUTO-ENTMCNC: 31.1 G/DL (ref 33.6–35)
MCV RBC AUTO: 81.2 FL (ref 81.4–97.8)
MONOCYTES # BLD AUTO: 0.67 K/UL (ref 0–0.85)
MONOCYTES NFR BLD AUTO: 8 % (ref 0–13.4)
NEUTROPHILS # BLD AUTO: 4.02 K/UL (ref 2–7.15)
NEUTROPHILS NFR BLD: 48.3 % (ref 44–72)
NRBC # BLD AUTO: 0 K/UL
NRBC BLD-RTO: 0 /100 WBC
PATHOLOGY CONSULT NOTE: NORMAL
PLATELET # BLD AUTO: 335 K/UL (ref 164–446)
PMV BLD AUTO: 9.1 FL (ref 9–12.9)
POTASSIUM SERPL-SCNC: 4 MMOL/L (ref 3.6–5.5)
PROT SERPL-MCNC: 6.2 G/DL (ref 6–8.2)
RBC # BLD AUTO: 4.63 M/UL (ref 4.2–5.4)
SODIUM SERPL-SCNC: 140 MMOL/L (ref 135–145)
WBC # BLD AUTO: 8.3 K/UL (ref 4.8–10.8)

## 2020-06-05 PROCEDURE — 0TC68ZZ EXTIRPATION OF MATTER FROM RIGHT URETER, VIA NATURAL OR ARTIFICIAL OPENING ENDOSCOPIC: ICD-10-PCS | Performed by: UROLOGY

## 2020-06-05 PROCEDURE — 160028 HCHG SURGERY MINUTES - 1ST 30 MINS LEVEL 3: Performed by: UROLOGY

## 2020-06-05 PROCEDURE — 99233 SBSQ HOSP IP/OBS HIGH 50: CPT | Performed by: HOSPITALIST

## 2020-06-05 PROCEDURE — 501329 HCHG SET, CYSTO IRRIG Y TUR: Performed by: UROLOGY

## 2020-06-05 PROCEDURE — 36415 COLL VENOUS BLD VENIPUNCTURE: CPT

## 2020-06-05 PROCEDURE — 700111 HCHG RX REV CODE 636 W/ 250 OVERRIDE (IP): Performed by: HOSPITALIST

## 2020-06-05 PROCEDURE — 700105 HCHG RX REV CODE 258: Performed by: ANESTHESIOLOGY

## 2020-06-05 PROCEDURE — 160002 HCHG RECOVERY MINUTES (STAT): Performed by: UROLOGY

## 2020-06-05 PROCEDURE — 160035 HCHG PACU - 1ST 60 MINS PHASE I: Performed by: UROLOGY

## 2020-06-05 PROCEDURE — 700102 HCHG RX REV CODE 250 W/ 637 OVERRIDE(OP): Performed by: HOSPITALIST

## 2020-06-05 PROCEDURE — 700101 HCHG RX REV CODE 250: Performed by: ANESTHESIOLOGY

## 2020-06-05 PROCEDURE — 160048 HCHG OR STATISTICAL LEVEL 1-5: Performed by: UROLOGY

## 2020-06-05 PROCEDURE — 700105 HCHG RX REV CODE 258: Performed by: HOSPITALIST

## 2020-06-05 PROCEDURE — 80053 COMPREHEN METABOLIC PANEL: CPT

## 2020-06-05 PROCEDURE — 770006 HCHG ROOM/CARE - MED/SURG/GYN SEMI*

## 2020-06-05 PROCEDURE — 0TP98DZ REMOVAL OF INTRALUMINAL DEVICE FROM URETER, VIA NATURAL OR ARTIFICIAL OPENING ENDOSCOPIC: ICD-10-PCS | Performed by: UROLOGY

## 2020-06-05 PROCEDURE — 700111 HCHG RX REV CODE 636 W/ 250 OVERRIDE (IP): Performed by: ANESTHESIOLOGY

## 2020-06-05 PROCEDURE — 85025 COMPLETE CBC W/AUTO DIFF WBC: CPT

## 2020-06-05 PROCEDURE — 500879 HCHG PACK, CYSTO: Performed by: UROLOGY

## 2020-06-05 PROCEDURE — 160009 HCHG ANES TIME/MIN: Performed by: UROLOGY

## 2020-06-05 PROCEDURE — A9270 NON-COVERED ITEM OR SERVICE: HCPCS | Performed by: HOSPITALIST

## 2020-06-05 PROCEDURE — 82365 CALCULUS SPECTROSCOPY: CPT

## 2020-06-05 PROCEDURE — 160039 HCHG SURGERY MINUTES - EA ADDL 1 MIN LEVEL 3: Performed by: UROLOGY

## 2020-06-05 PROCEDURE — C1769 GUIDE WIRE: HCPCS | Performed by: UROLOGY

## 2020-06-05 PROCEDURE — 0TCB8ZZ EXTIRPATION OF MATTER FROM BLADDER, VIA NATURAL OR ARTIFICIAL OPENING ENDOSCOPIC: ICD-10-PCS | Performed by: UROLOGY

## 2020-06-05 PROCEDURE — 0T768DZ DILATION OF RIGHT URETER WITH INTRALUMINAL DEVICE, VIA NATURAL OR ARTIFICIAL OPENING ENDOSCOPIC: ICD-10-PCS | Performed by: UROLOGY

## 2020-06-05 PROCEDURE — 88300 SURGICAL PATH GROSS: CPT

## 2020-06-05 RX ORDER — HYDRALAZINE HYDROCHLORIDE 20 MG/ML
5 INJECTION INTRAMUSCULAR; INTRAVENOUS
Status: DISCONTINUED | OUTPATIENT
Start: 2020-06-05 | End: 2020-06-06 | Stop reason: HOSPADM

## 2020-06-05 RX ORDER — DIPHENHYDRAMINE HYDROCHLORIDE 50 MG/ML
12.5 INJECTION INTRAMUSCULAR; INTRAVENOUS
Status: DISCONTINUED | OUTPATIENT
Start: 2020-06-05 | End: 2020-06-06 | Stop reason: HOSPADM

## 2020-06-05 RX ORDER — DEXAMETHASONE SODIUM PHOSPHATE 4 MG/ML
INJECTION, SOLUTION INTRA-ARTICULAR; INTRALESIONAL; INTRAMUSCULAR; INTRAVENOUS; SOFT TISSUE PRN
Status: DISCONTINUED | OUTPATIENT
Start: 2020-06-05 | End: 2020-06-05 | Stop reason: SURG

## 2020-06-05 RX ORDER — ONDANSETRON 2 MG/ML
4 INJECTION INTRAMUSCULAR; INTRAVENOUS
Status: DISCONTINUED | OUTPATIENT
Start: 2020-06-05 | End: 2020-06-06 | Stop reason: HOSPADM

## 2020-06-05 RX ORDER — LIDOCAINE HYDROCHLORIDE 20 MG/ML
INJECTION, SOLUTION EPIDURAL; INFILTRATION; INTRACAUDAL; PERINEURAL PRN
Status: DISCONTINUED | OUTPATIENT
Start: 2020-06-05 | End: 2020-06-05 | Stop reason: SURG

## 2020-06-05 RX ORDER — HALOPERIDOL 5 MG/ML
1 INJECTION INTRAMUSCULAR
Status: DISCONTINUED | OUTPATIENT
Start: 2020-06-05 | End: 2020-06-06 | Stop reason: HOSPADM

## 2020-06-05 RX ORDER — MEPERIDINE HYDROCHLORIDE 25 MG/ML
6.25 INJECTION INTRAMUSCULAR; INTRAVENOUS; SUBCUTANEOUS
Status: DISCONTINUED | OUTPATIENT
Start: 2020-06-05 | End: 2020-06-06 | Stop reason: HOSPADM

## 2020-06-05 RX ORDER — ONDANSETRON 2 MG/ML
INJECTION INTRAMUSCULAR; INTRAVENOUS PRN
Status: DISCONTINUED | OUTPATIENT
Start: 2020-06-05 | End: 2020-06-05 | Stop reason: SURG

## 2020-06-05 RX ORDER — SODIUM CHLORIDE, SODIUM LACTATE, POTASSIUM CHLORIDE, CALCIUM CHLORIDE 600; 310; 30; 20 MG/100ML; MG/100ML; MG/100ML; MG/100ML
INJECTION, SOLUTION INTRAVENOUS CONTINUOUS
Status: DISCONTINUED | OUTPATIENT
Start: 2020-06-05 | End: 2020-06-06 | Stop reason: HOSPADM

## 2020-06-05 RX ADMIN — FENTANYL CITRATE 50 MCG: 50 INJECTION, SOLUTION INTRAMUSCULAR; INTRAVENOUS at 17:50

## 2020-06-05 RX ADMIN — SODIUM CHLORIDE, POTASSIUM CHLORIDE, SODIUM LACTATE AND CALCIUM CHLORIDE: 600; 310; 30; 20 INJECTION, SOLUTION INTRAVENOUS at 19:08

## 2020-06-05 RX ADMIN — PROPOFOL 50 MG: 10 INJECTION, EMULSION INTRAVENOUS at 17:11

## 2020-06-05 RX ADMIN — PROPOFOL 200 MG: 10 INJECTION, EMULSION INTRAVENOUS at 16:50

## 2020-06-05 RX ADMIN — FENTANYL CITRATE 100 MCG: 50 INJECTION, SOLUTION INTRAMUSCULAR; INTRAVENOUS at 16:50

## 2020-06-05 RX ADMIN — FENTANYL CITRATE 50 MCG: 50 INJECTION, SOLUTION INTRAMUSCULAR; INTRAVENOUS at 18:50

## 2020-06-05 RX ADMIN — ONDANSETRON 4 MG: 2 INJECTION INTRAMUSCULAR; INTRAVENOUS at 19:07

## 2020-06-05 RX ADMIN — OXYCODONE HYDROCHLORIDE 10 MG: 5 TABLET ORAL at 20:55

## 2020-06-05 RX ADMIN — HYDROMORPHONE HYDROCHLORIDE 0.5 MG: 1 INJECTION, SOLUTION INTRAMUSCULAR; INTRAVENOUS; SUBCUTANEOUS at 21:41

## 2020-06-05 RX ADMIN — DEXAMETHASONE SODIUM PHOSPHATE 8 MG: 4 INJECTION, SOLUTION INTRAMUSCULAR; INTRAVENOUS at 16:55

## 2020-06-05 RX ADMIN — FENTANYL CITRATE 50 MCG: 50 INJECTION, SOLUTION INTRAMUSCULAR; INTRAVENOUS at 17:11

## 2020-06-05 RX ADMIN — LIDOCAINE HYDROCHLORIDE 100 MG: 20 INJECTION, SOLUTION EPIDURAL; INFILTRATION; INTRACAUDAL; PERINEURAL at 16:50

## 2020-06-05 RX ADMIN — SODIUM CHLORIDE, POTASSIUM CHLORIDE, SODIUM LACTATE AND CALCIUM CHLORIDE: 600; 310; 30; 20 INJECTION, SOLUTION INTRAVENOUS at 20:55

## 2020-06-05 RX ADMIN — FENTANYL CITRATE 50 MCG: 50 INJECTION, SOLUTION INTRAMUSCULAR; INTRAVENOUS at 18:19

## 2020-06-05 RX ADMIN — CEFTRIAXONE SODIUM 2 G: 2 INJECTION, POWDER, FOR SOLUTION INTRAMUSCULAR; INTRAVENOUS at 05:56

## 2020-06-05 ASSESSMENT — ENCOUNTER SYMPTOMS
FEVER: 0
COUGH: 0
NECK PAIN: 0
SORE THROAT: 0
INSOMNIA: 0
TINGLING: 0
BLURRED VISION: 0
PALPITATIONS: 0
BACK PAIN: 0
DEPRESSION: 0
NAUSEA: 0
HEADACHES: 0
SHORTNESS OF BREATH: 0
VOMITING: 0
CHILLS: 0
FLANK PAIN: 1
CHILLS: 1
FLANK PAIN: 0
EYE PAIN: 0
ABDOMINAL PAIN: 0
DIZZINESS: 0

## 2020-06-05 ASSESSMENT — COGNITIVE AND FUNCTIONAL STATUS - GENERAL
STANDING UP FROM CHAIR USING ARMS: A LITTLE
DAILY ACTIVITIY SCORE: 18
PERSONAL GROOMING: A LITTLE
WALKING IN HOSPITAL ROOM: A LITTLE
SUGGESTED CMS G CODE MODIFIER DAILY ACTIVITY: CK
MOBILITY SCORE: 18
DRESSING REGULAR LOWER BODY CLOTHING: A LITTLE
MOVING FROM LYING ON BACK TO SITTING ON SIDE OF FLAT BED: A LITTLE
TOILETING: A LITTLE
EATING MEALS: A LITTLE
SUGGESTED CMS G CODE MODIFIER MOBILITY: CK
HELP NEEDED FOR BATHING: A LITTLE
MOVING TO AND FROM BED TO CHAIR: A LITTLE
TURNING FROM BACK TO SIDE WHILE IN FLAT BAD: A LITTLE
CLIMB 3 TO 5 STEPS WITH RAILING: A LITTLE
DRESSING REGULAR UPPER BODY CLOTHING: A LITTLE

## 2020-06-05 NOTE — ANESTHESIA PREPROCEDURE EVALUATION
45 y/o female with large bladder stone after having retained stent following removal of kidney stone, also is a smoker, + marajuana, no other drugs, probable undiagnosed hypertension, no CP, SOB.    Relevant Problems      (+) Hydronephrosis   (+) Nephrolithiasis       Physical Exam    Airway   Mallampati: II  TM distance: >3 FB  Neck ROM: full       Cardiovascular - normal exam  Rhythm: regular  Rate: normal  (-) murmur     Dental   (+) upper dentures, lower dentures      Very poor dentition   Pulmonary - normal exam  Breath sounds clear to auscultation     Abdominal    Neurological - normal exam         Other findings: Rotten teeth, missing teeth          Anesthesia Plan    ASA 3   ASA physical status 3 criteria: hypertension - poorly controlled    Plan - general       Airway plan will be LMA        Induction: intravenous      Pertinent diagnostic labs and testing reviewed    Informed Consent:    Anesthetic plan and risks discussed with patient.

## 2020-06-05 NOTE — PROGRESS NOTES
[facility] Medicine Daily Progress Note    Date of Service  6/5/2020    Chief Complaint  44 y.o. female admitted 6/3/2020 with flank pain.     Hospital Course    She was found to have a complicated UTI with sepsis. This was due to an indwelling stent and sanchez with right sided hydronephrosis. She was placed onto IV antibiotics and sepsis protocols. Urology was emergently consulted for stent removal.       Interval Problem Update  6/4- Vitals stable over night. Lactic acid improving. I reviewed her imaging below.   6/5- pending urology recommendations and procedure. Vitals stable and afebrile. She if tired but says her pain is gone.       KUB  1.  Moderate stool in the colon suggests changes of constipation, otherwise nonspecific bowel gas pattern  2.  Nephrolithiasis  3.  Bladder stones along the urinary stent.    US renal  1.  Moderate to severe right hydronephrosis and calyceal dilatation  2.  Echogenic liver compatible fatty change versus fibrosis.    Prolonged services from 940-1013am today.     Consultants/Specialty  urology    Code Status  full    Disposition  tbd    Review of Systems  Review of Systems   Constitutional: Positive for chills and malaise/fatigue. Negative for fever.   HENT: Negative for sore throat.    Eyes: Negative for blurred vision and pain.   Respiratory: Negative for cough and shortness of breath.    Cardiovascular: Negative for chest pain and palpitations.   Gastrointestinal: Negative for abdominal pain, nausea and vomiting.   Genitourinary: Negative for dysuria, flank pain and urgency.   Musculoskeletal: Negative for back pain and neck pain.   Skin: Negative for itching and rash.   Neurological: Negative for dizziness, tingling and headaches.   Psychiatric/Behavioral: Negative for depression. The patient does not have insomnia.    All other systems reviewed and are negative.       Physical Exam  Temp:  [36.5 °C (97.7 °F)-37 °C (98.6 °F)] 36.5 °C (97.7 °F)  Pulse:  [71-79] 79  Resp:   [18-20] 20  BP: (130-142)/(63-79) 141/79  SpO2:  [92 %-96 %] 93 %    Physical Exam  Vitals signs and nursing note reviewed.   Constitutional:       General: She is not in acute distress.     Appearance: She is well-developed. She is not diaphoretic.   HENT:      Right Ear: External ear normal.      Left Ear: External ear normal.      Nose: Nose normal.   Eyes:      General:         Right eye: No discharge.         Left eye: No discharge.      Conjunctiva/sclera: Conjunctivae normal.   Neck:      Vascular: No JVD.   Cardiovascular:      Rate and Rhythm: Regular rhythm.      Heart sounds: Normal heart sounds. No murmur.   Pulmonary:      Effort: Pulmonary effort is normal. No respiratory distress.      Breath sounds: Normal breath sounds. No stridor. No wheezing or rales.   Abdominal:      General: Bowel sounds are normal. There is no distension.      Palpations: Abdomen is soft.      Tenderness: There is abdominal tenderness.      Comments: cvat   Musculoskeletal:         General: No tenderness.   Skin:     General: Skin is warm and dry.      Findings: No erythema.   Neurological:      Mental Status: She is alert and oriented to person, place, and time.   Psychiatric:         Behavior: Behavior normal.         Fluids    Intake/Output Summary (Last 24 hours) at 6/5/2020 0817  Last data filed at 6/5/2020 0600  Gross per 24 hour   Intake 2295 ml   Output 1300 ml   Net 995 ml       Laboratory  Recent Labs     06/04/20  0000   WBC 11.7*   RBC 4.55   HEMOGLOBIN 11.7*   HEMATOCRIT 36.5*   MCV 80.2*   MCH 25.7*   MCHC 32.1*   RDW 43.3   PLATELETCT 381   MPV 9.1     Recent Labs     06/04/20  0000   SODIUM 139   POTASSIUM 4.1   CHLORIDE 105   CO2 24   GLUCOSE 147*   BUN 18   CREATININE 1.08   CALCIUM 8.4     Recent Labs     06/04/20  0000   INR 0.86*               Imaging  [id number] VIEW   Final Result         1.  Moderate stool in the colon suggests changes of constipation, otherwise nonspecific bowel gas pattern   2.   Nephrolithiasis   3.  Bladder stones along the urinary stent.      US-RENAL   Final Result         1.  Moderate to severe right hydronephrosis and calyceal dilatation   2.  Echogenic liver compatible fatty change versus fibrosis.           Assessment/Plan  * Complicated UTI (urinary tract infection)- (present on admission)  Assessment & Plan  With sepsis, due to indwelling stent present over a year. With right sided severe hydronephrosis.   Urinalysis positive with pending culture. Blood cultures negative to date.   Continue ceftriaxone 2 grams IV every 24 hours.  Needs to have stent removed, urology consulted. pending opinion and intervention.       Sepsis (HCC)- (present on admission)  Assessment & Plan  This is Sepsis Present on admission  SIRS criteria identified on my evaluation include: Tachycardia, with heart rate greater than 90 BPM  Source is UTI.  Sepsis protocol initiated  Fluid resuscitation ordered per protocol  IV antibiotics as appropriate for source of sepsis  While organ dysfunction may be noted elsewhere in this problem list or in the chart, degree of organ dysfunction does not meet CMS criteria for severe sepsis          Nephrolithiasis- (present on admission)  Assessment & Plan  History of stent one year ago and lost to follow up in [place] to get it removed.  Evaluation by urology pending and going to OR for removal.    Hydronephrosis- (present on admission)  Assessment & Plan  Due to stent in place for prolonged amount of time.  NPO for surgery, urology to consult and take patient to OR for stent removal.       VTE prophylaxis: scd

## 2020-06-05 NOTE — DISCHARGE PLANNING
SW received a call from a friend or family member who reported they are staying in [place] for this patient. They reported they have been here for 2-days are unable to stay any longer and need to return home. SW reported to them that individuals who have insurance are assured transportation home. This person seemed to feel better, reported she would call patient and let her know.     PLAN: Upon D/C, [employer] has ability to offer transportation home.

## 2020-06-05 NOTE — PROGRESS NOTES
Note to reader: this note follows the APSO format rather than the historical SOAP format. Assessment and plan located at the top of the note for ease of use.    Chief Complaint  44 y.o. year old female here with No chief complaint on file.      Assessment/Plan  Interval History   Active Hospital Problems    Diagnosis   • Sepsis (HCC) [A41.9]     Priority: High   • Nephrolithiasis [N20.0]     Priority: High     IMO load March 2020     • Hydronephrosis [N13.30]     Priority: Medium   • Complicated UTI (urinary tract infection) [N39.0]     Priority: Medium      patient seen and examined. Plan for surgery later this evening with Dr. Pitt. Afebrile. WBC WNL. On Rocephin.     Disposition  Stable.    PLAN:   - Planning to OR this evening with Dr. Pitt for CULTS depending side with Dr. Pitt.   - Urology will continue to follow while inpatient.       Review of Systems  Physical Exam   Review of Systems   Constitutional: Negative for chills and fever.   Gastrointestinal: Negative for abdominal pain, nausea and vomiting.   Genitourinary: Positive for flank pain.   Neurological: Negative for dizziness.   All other systems reviewed and are negative.    Vitals:    06/04/20 1700 06/04/20 2203 06/05/20 0600 06/05/20 1010   BP: 142/66 130/66 141/79 (!) 164/82   Pulse: 71 72 79 73   Resp: 18 20 20 20   Temp: 36.6 °C (97.8 °F) 37 °C (98.6 °F) 36.5 °C (97.7 °F) 36.2 °C (97.2 °F)   TempSrc: Oral Oral Oral Oral   SpO2: 96% 95% 93% 93%   Weight:       Height:         Physical Exam   Constitutional: She is oriented to person, place, and time and well-developed, well-nourished, and in no distress.   HENT:   Head: Normocephalic and atraumatic.   Eyes: Pupils are equal, round, and reactive to light.   Neck: Normal range of motion.   Pulmonary/Chest: Effort normal.   Abdominal: Soft. She exhibits no distension.   Neurological: She is alert and oriented to person, place, and time.   Skin: Skin is warm.   Nursing note and vitals reviewed.        Hematology Chemistry   Lab Results   Component Value Date/Time    WBC 8.3 06/05/2020 08:24 AM    HEMOGLOBIN 11.7 (L) 06/05/2020 08:24 AM    HEMATOCRIT 37.6 06/05/2020 08:24 AM    PLATELETCT 335 06/05/2020 08:24 AM     Lab Results   Component Value Date/Time    SODIUM 140 06/05/2020 08:24 AM    POTASSIUM 4.0 06/05/2020 08:24 AM    CHLORIDE 105 06/05/2020 08:24 AM    CO2 25 06/05/2020 08:24 AM    GLUCOSE 92 06/05/2020 08:24 AM    BUN 12 06/05/2020 08:24 AM    CREATININE 0.80 06/05/2020 08:24 AM         Labs not explicitly included in this progress note were reviewed by the author.   Radiology/imaging not explicitly included in this progress note was reviewed by the author.     Core Measures

## 2020-06-05 NOTE — CARE PLAN
Problem: Pain Management  Goal: Pain level will decrease to patient's comfort goal  Outcome: PROGRESSING AS EXPECTED  Intervention: Follow pain managment plan developed in collaboration with patient and Interdisciplinary Team  Note: Pt medicated with Toradol for pain with improvement in comfort.     Problem: Urinary Elimination:  Goal: Ability to reestablish a normal urinary elimination pattern will improve  Outcome: PROGRESSING SLOWER THAN EXPECTED  Flowsheets (Taken 6/4/2020 2100)  Urinary Elimination:   Dysuria   Frequency   Hesitancy   Urgency  Note: Pt experiencing dysuria, frequency, hesitancy, and urgency. Monitoring urine output. Discussed with pt plans for procedure with Dr. Pitt at 1545.

## 2020-06-06 ENCOUNTER — APPOINTMENT (OUTPATIENT)
Dept: RADIOLOGY | Facility: MEDICAL CENTER | Age: 45
DRG: 659 | End: 2020-06-06
Attending: UROLOGY
Payer: COMMERCIAL

## 2020-06-06 ENCOUNTER — ANESTHESIA (OUTPATIENT)
Dept: SURGERY | Facility: MEDICAL CENTER | Age: 45
DRG: 659 | End: 2020-06-06
Payer: COMMERCIAL

## 2020-06-06 ENCOUNTER — ANESTHESIA EVENT (OUTPATIENT)
Dept: SURGERY | Facility: MEDICAL CENTER | Age: 45
DRG: 659 | End: 2020-06-06
Payer: COMMERCIAL

## 2020-06-06 LAB
ALBUMIN SERPL BCP-MCNC: 3.6 G/DL (ref 3.2–4.9)
ALBUMIN/GLOB SERPL: 1.4 G/DL
ALP SERPL-CCNC: 84 U/L (ref 30–99)
ALT SERPL-CCNC: 11 U/L (ref 2–50)
ANION GAP SERPL CALC-SCNC: 11 MMOL/L (ref 7–16)
AST SERPL-CCNC: 14 U/L (ref 12–45)
BACTERIA UR CULT: NORMAL
BASOPHILS # BLD AUTO: 0.2 % (ref 0–1.8)
BASOPHILS # BLD: 0.03 K/UL (ref 0–0.12)
BILIRUB SERPL-MCNC: <0.2 MG/DL (ref 0.1–1.5)
BUN SERPL-MCNC: 12 MG/DL (ref 8–22)
CALCIUM SERPL-MCNC: 8.2 MG/DL (ref 8.4–10.2)
CHLORIDE SERPL-SCNC: 104 MMOL/L (ref 96–112)
CO2 SERPL-SCNC: 23 MMOL/L (ref 20–33)
CREAT SERPL-MCNC: 0.84 MG/DL (ref 0.5–1.4)
EOSINOPHIL # BLD AUTO: 0 K/UL (ref 0–0.51)
EOSINOPHIL NFR BLD: 0 % (ref 0–6.9)
ERYTHROCYTE [DISTWIDTH] IN BLOOD BY AUTOMATED COUNT: 41.9 FL (ref 35.9–50)
GLOBULIN SER CALC-MCNC: 2.6 G/DL (ref 1.9–3.5)
GLUCOSE SERPL-MCNC: 137 MG/DL (ref 65–99)
HCT VFR BLD AUTO: 37.7 % (ref 37–47)
HGB BLD-MCNC: 12 G/DL (ref 12–16)
IMM GRANULOCYTES # BLD AUTO: 0.15 K/UL (ref 0–0.11)
IMM GRANULOCYTES NFR BLD AUTO: 0.9 % (ref 0–0.9)
LYMPHOCYTES # BLD AUTO: 1.34 K/UL (ref 1–4.8)
LYMPHOCYTES NFR BLD: 8.4 % (ref 22–41)
MCH RBC QN AUTO: 25.5 PG (ref 27–33)
MCHC RBC AUTO-ENTMCNC: 31.8 G/DL (ref 33.6–35)
MCV RBC AUTO: 80 FL (ref 81.4–97.8)
MONOCYTES # BLD AUTO: 0.36 K/UL (ref 0–0.85)
MONOCYTES NFR BLD AUTO: 2.3 % (ref 0–13.4)
NEUTROPHILS # BLD AUTO: 14.07 K/UL (ref 2–7.15)
NEUTROPHILS NFR BLD: 88.2 % (ref 44–72)
NRBC # BLD AUTO: 0 K/UL
NRBC BLD-RTO: 0 /100 WBC
PLATELET # BLD AUTO: 376 K/UL (ref 164–446)
PMV BLD AUTO: 8.8 FL (ref 9–12.9)
POTASSIUM SERPL-SCNC: 4.5 MMOL/L (ref 3.6–5.5)
PROT SERPL-MCNC: 6.2 G/DL (ref 6–8.2)
RBC # BLD AUTO: 4.71 M/UL (ref 4.2–5.4)
SIGNIFICANT IND 70042: NORMAL
SITE SITE: NORMAL
SODIUM SERPL-SCNC: 138 MMOL/L (ref 135–145)
SOURCE SOURCE: NORMAL
WBC # BLD AUTO: 16 K/UL (ref 4.8–10.8)

## 2020-06-06 PROCEDURE — 700111 HCHG RX REV CODE 636 W/ 250 OVERRIDE (IP): Performed by: HOSPITALIST

## 2020-06-06 PROCEDURE — 770006 HCHG ROOM/CARE - MED/SURG/GYN SEMI*

## 2020-06-06 PROCEDURE — C2617 STENT, NON-COR, TEM W/O DEL: HCPCS | Performed by: UROLOGY

## 2020-06-06 PROCEDURE — 0TC38ZZ EXTIRPATION OF MATTER FROM RIGHT KIDNEY PELVIS, VIA NATURAL OR ARTIFICIAL OPENING ENDOSCOPIC: ICD-10-PCS | Performed by: UROLOGY

## 2020-06-06 PROCEDURE — A9270 NON-COVERED ITEM OR SERVICE: HCPCS | Performed by: UROLOGY

## 2020-06-06 PROCEDURE — 700102 HCHG RX REV CODE 250 W/ 637 OVERRIDE(OP)

## 2020-06-06 PROCEDURE — 160009 HCHG ANES TIME/MIN: Performed by: UROLOGY

## 2020-06-06 PROCEDURE — C1769 GUIDE WIRE: HCPCS | Performed by: UROLOGY

## 2020-06-06 PROCEDURE — 700111 HCHG RX REV CODE 636 W/ 250 OVERRIDE (IP): Performed by: ANESTHESIOLOGY

## 2020-06-06 PROCEDURE — 160039 HCHG SURGERY MINUTES - EA ADDL 1 MIN LEVEL 3: Performed by: UROLOGY

## 2020-06-06 PROCEDURE — 160036 HCHG PACU - EA ADDL 30 MINS PHASE I: Performed by: UROLOGY

## 2020-06-06 PROCEDURE — 85025 COMPLETE CBC W/AUTO DIFF WBC: CPT

## 2020-06-06 PROCEDURE — 160028 HCHG SURGERY MINUTES - 1ST 30 MINS LEVEL 3: Performed by: UROLOGY

## 2020-06-06 PROCEDURE — A9270 NON-COVERED ITEM OR SERVICE: HCPCS | Performed by: HOSPITALIST

## 2020-06-06 PROCEDURE — 700105 HCHG RX REV CODE 258: Performed by: UROLOGY

## 2020-06-06 PROCEDURE — 99233 SBSQ HOSP IP/OBS HIGH 50: CPT | Performed by: HOSPITALIST

## 2020-06-06 PROCEDURE — 700102 HCHG RX REV CODE 250 W/ 637 OVERRIDE(OP): Performed by: HOSPITALIST

## 2020-06-06 PROCEDURE — 0TP98DZ REMOVAL OF INTRALUMINAL DEVICE FROM URETER, VIA NATURAL OR ARTIFICIAL OPENING ENDOSCOPIC: ICD-10-PCS | Performed by: UROLOGY

## 2020-06-06 PROCEDURE — 700105 HCHG RX REV CODE 258: Performed by: HOSPITALIST

## 2020-06-06 PROCEDURE — C1894 INTRO/SHEATH, NON-LASER: HCPCS | Performed by: UROLOGY

## 2020-06-06 PROCEDURE — 0TCB8ZZ EXTIRPATION OF MATTER FROM BLADDER, VIA NATURAL OR ARTIFICIAL OPENING ENDOSCOPIC: ICD-10-PCS | Performed by: UROLOGY

## 2020-06-06 PROCEDURE — 160002 HCHG RECOVERY MINUTES (STAT): Performed by: UROLOGY

## 2020-06-06 PROCEDURE — 700102 HCHG RX REV CODE 250 W/ 637 OVERRIDE(OP): Performed by: UROLOGY

## 2020-06-06 PROCEDURE — 700101 HCHG RX REV CODE 250: Performed by: ANESTHESIOLOGY

## 2020-06-06 PROCEDURE — A9270 NON-COVERED ITEM OR SERVICE: HCPCS

## 2020-06-06 PROCEDURE — C1758 CATHETER, URETERAL: HCPCS | Performed by: UROLOGY

## 2020-06-06 PROCEDURE — 36415 COLL VENOUS BLD VENIPUNCTURE: CPT

## 2020-06-06 PROCEDURE — 80053 COMPREHEN METABOLIC PANEL: CPT

## 2020-06-06 PROCEDURE — 501329 HCHG SET, CYSTO IRRIG Y TUR: Performed by: UROLOGY

## 2020-06-06 PROCEDURE — 160035 HCHG PACU - 1ST 60 MINS PHASE I: Performed by: UROLOGY

## 2020-06-06 PROCEDURE — 160048 HCHG OR STATISTICAL LEVEL 1-5: Performed by: UROLOGY

## 2020-06-06 DEVICE — STENT UROLOGICAL POLARIS 6X22  ULTRA: Type: IMPLANTABLE DEVICE | Site: URETER | Status: FUNCTIONAL

## 2020-06-06 RX ORDER — SODIUM CHLORIDE, SODIUM LACTATE, POTASSIUM CHLORIDE, CALCIUM CHLORIDE 600; 310; 30; 20 MG/100ML; MG/100ML; MG/100ML; MG/100ML
INJECTION, SOLUTION INTRAVENOUS CONTINUOUS
Status: DISCONTINUED | OUTPATIENT
Start: 2020-06-06 | End: 2020-06-06 | Stop reason: HOSPADM

## 2020-06-06 RX ORDER — HYDROMORPHONE HYDROCHLORIDE 1 MG/ML
0.2 INJECTION, SOLUTION INTRAMUSCULAR; INTRAVENOUS; SUBCUTANEOUS
Status: DISCONTINUED | OUTPATIENT
Start: 2020-06-06 | End: 2020-06-06 | Stop reason: HOSPADM

## 2020-06-06 RX ORDER — PHENAZOPYRIDINE HYDROCHLORIDE 200 MG/1
200 TABLET, FILM COATED ORAL 3 TIMES DAILY PRN
Qty: 24 TAB | Refills: 0 | Status: SHIPPED | OUTPATIENT
Start: 2020-06-06 | End: 2020-06-12

## 2020-06-06 RX ORDER — HALOPERIDOL 5 MG/ML
1 INJECTION INTRAMUSCULAR
Status: DISCONTINUED | OUTPATIENT
Start: 2020-06-06 | End: 2020-06-06 | Stop reason: HOSPADM

## 2020-06-06 RX ORDER — SODIUM CHLORIDE, SODIUM LACTATE, POTASSIUM CHLORIDE, CALCIUM CHLORIDE 600; 310; 30; 20 MG/100ML; MG/100ML; MG/100ML; MG/100ML
INJECTION, SOLUTION INTRAVENOUS CONTINUOUS
Status: ACTIVE | OUTPATIENT
Start: 2020-06-06 | End: 2020-06-06

## 2020-06-06 RX ORDER — PHENAZOPYRIDINE HYDROCHLORIDE 200 MG/1
100 TABLET, FILM COATED ORAL 2 TIMES DAILY WITH MEALS
Status: DISCONTINUED | OUTPATIENT
Start: 2020-06-06 | End: 2020-06-08 | Stop reason: HOSPADM

## 2020-06-06 RX ORDER — LABETALOL HYDROCHLORIDE 5 MG/ML
5 INJECTION, SOLUTION INTRAVENOUS
Status: DISCONTINUED | OUTPATIENT
Start: 2020-06-06 | End: 2020-06-06 | Stop reason: HOSPADM

## 2020-06-06 RX ORDER — ONDANSETRON 2 MG/ML
INJECTION INTRAMUSCULAR; INTRAVENOUS PRN
Status: DISCONTINUED | OUTPATIENT
Start: 2020-06-06 | End: 2020-06-06 | Stop reason: SURG

## 2020-06-06 RX ORDER — OXYCODONE HCL 5 MG/5 ML
5 SOLUTION, ORAL ORAL
Status: DISCONTINUED | OUTPATIENT
Start: 2020-06-06 | End: 2020-06-06 | Stop reason: HOSPADM

## 2020-06-06 RX ORDER — DEXAMETHASONE SODIUM PHOSPHATE 4 MG/ML
INJECTION, SOLUTION INTRA-ARTICULAR; INTRALESIONAL; INTRAMUSCULAR; INTRAVENOUS; SOFT TISSUE PRN
Status: DISCONTINUED | OUTPATIENT
Start: 2020-06-06 | End: 2020-06-06 | Stop reason: SURG

## 2020-06-06 RX ORDER — HYDRALAZINE HYDROCHLORIDE 20 MG/ML
5 INJECTION INTRAMUSCULAR; INTRAVENOUS
Status: DISCONTINUED | OUTPATIENT
Start: 2020-06-06 | End: 2020-06-06 | Stop reason: HOSPADM

## 2020-06-06 RX ORDER — CIPROFLOXACIN 500 MG/1
500 TABLET, FILM COATED ORAL 2 TIMES DAILY
Qty: 12 TAB | Refills: 0 | Status: SHIPPED | OUTPATIENT
Start: 2020-06-06 | End: 2020-06-07 | Stop reason: SDUPTHER

## 2020-06-06 RX ORDER — MEPERIDINE HYDROCHLORIDE 25 MG/ML
12.5 INJECTION INTRAMUSCULAR; INTRAVENOUS; SUBCUTANEOUS
Status: DISCONTINUED | OUTPATIENT
Start: 2020-06-06 | End: 2020-06-06 | Stop reason: HOSPADM

## 2020-06-06 RX ORDER — HYDROMORPHONE HYDROCHLORIDE 1 MG/ML
0.4 INJECTION, SOLUTION INTRAMUSCULAR; INTRAVENOUS; SUBCUTANEOUS
Status: DISCONTINUED | OUTPATIENT
Start: 2020-06-06 | End: 2020-06-06 | Stop reason: HOSPADM

## 2020-06-06 RX ORDER — MIDAZOLAM HYDROCHLORIDE 1 MG/ML
1 INJECTION INTRAMUSCULAR; INTRAVENOUS
Status: DISCONTINUED | OUTPATIENT
Start: 2020-06-06 | End: 2020-06-06 | Stop reason: HOSPADM

## 2020-06-06 RX ORDER — HYDROMORPHONE HYDROCHLORIDE 1 MG/ML
0.1 INJECTION, SOLUTION INTRAMUSCULAR; INTRAVENOUS; SUBCUTANEOUS
Status: DISCONTINUED | OUTPATIENT
Start: 2020-06-06 | End: 2020-06-06 | Stop reason: HOSPADM

## 2020-06-06 RX ORDER — TAMSULOSIN HYDROCHLORIDE 0.4 MG/1
0.4 CAPSULE ORAL DAILY
Qty: 6 CAP | Refills: 0 | Status: SHIPPED | OUTPATIENT
Start: 2020-06-06 | End: 2020-06-12

## 2020-06-06 RX ORDER — IPRATROPIUM BROMIDE AND ALBUTEROL SULFATE 2.5; .5 MG/3ML; MG/3ML
3 SOLUTION RESPIRATORY (INHALATION)
Status: DISCONTINUED | OUTPATIENT
Start: 2020-06-06 | End: 2020-06-06 | Stop reason: HOSPADM

## 2020-06-06 RX ORDER — ONDANSETRON 2 MG/ML
4 INJECTION INTRAMUSCULAR; INTRAVENOUS
Status: DISCONTINUED | OUTPATIENT
Start: 2020-06-06 | End: 2020-06-06 | Stop reason: HOSPADM

## 2020-06-06 RX ORDER — LIDOCAINE HYDROCHLORIDE 20 MG/ML
INJECTION, SOLUTION EPIDURAL; INFILTRATION; INTRACAUDAL; PERINEURAL PRN
Status: DISCONTINUED | OUTPATIENT
Start: 2020-06-06 | End: 2020-06-06 | Stop reason: SURG

## 2020-06-06 RX ORDER — OXYCODONE HCL 5 MG/5 ML
10 SOLUTION, ORAL ORAL
Status: DISCONTINUED | OUTPATIENT
Start: 2020-06-06 | End: 2020-06-06 | Stop reason: HOSPADM

## 2020-06-06 RX ADMIN — DEXAMETHASONE SODIUM PHOSPHATE 8 MG: 4 INJECTION, SOLUTION INTRAMUSCULAR; INTRAVENOUS at 09:53

## 2020-06-06 RX ADMIN — PROPOFOL 200 MG: 10 INJECTION, EMULSION INTRAVENOUS at 10:00

## 2020-06-06 RX ADMIN — CEFTRIAXONE SODIUM 2 G: 2 INJECTION, POWDER, FOR SOLUTION INTRAMUSCULAR; INTRAVENOUS at 05:57

## 2020-06-06 RX ADMIN — OXYCODONE HYDROCHLORIDE 10 MG: 5 TABLET ORAL at 05:56

## 2020-06-06 RX ADMIN — DOCUSATE SODIUM - SENNOSIDES 2 TABLET: 50; 8.6 TABLET, FILM COATED ORAL at 05:56

## 2020-06-06 RX ADMIN — ONDANSETRON 4 MG: 2 INJECTION INTRAMUSCULAR; INTRAVENOUS at 09:53

## 2020-06-06 RX ADMIN — OXYCODONE HYDROCHLORIDE 10 MG: 5 TABLET ORAL at 00:39

## 2020-06-06 RX ADMIN — PHENAZOPYRIDINE HYDROCHLORIDE 100 MG: 200 TABLET ORAL at 16:15

## 2020-06-06 RX ADMIN — PHENAZOPYRIDINE HYDROCHLORIDE 100 MG: 200 TABLET ORAL at 11:55

## 2020-06-06 RX ADMIN — DOCUSATE SODIUM - SENNOSIDES 2 TABLET: 50; 8.6 TABLET, FILM COATED ORAL at 16:15

## 2020-06-06 RX ADMIN — LIDOCAINE HYDROCHLORIDE 100 MG: 20 INJECTION, SOLUTION EPIDURAL; INFILTRATION; INTRACAUDAL; PERINEURAL at 10:00

## 2020-06-06 RX ADMIN — OXYCODONE HYDROCHLORIDE 10 MG: 5 TABLET ORAL at 12:47

## 2020-06-06 RX ADMIN — FENTANYL CITRATE 100 MCG: 50 INJECTION, SOLUTION INTRAMUSCULAR; INTRAVENOUS at 09:53

## 2020-06-06 RX ADMIN — OXYCODONE HYDROCHLORIDE 10 MG: 5 TABLET ORAL at 21:52

## 2020-06-06 RX ADMIN — SODIUM CHLORIDE, POTASSIUM CHLORIDE, SODIUM LACTATE AND CALCIUM CHLORIDE: 600; 310; 30; 20 INJECTION, SOLUTION INTRAVENOUS at 12:49

## 2020-06-06 RX ADMIN — OXYCODONE HYDROCHLORIDE 10 MG: 5 TABLET ORAL at 16:15

## 2020-06-06 RX ADMIN — MEPERIDINE HYDROCHLORIDE 12.5 MG: 25 INJECTION INTRAMUSCULAR; INTRAVENOUS; SUBCUTANEOUS at 11:39

## 2020-06-06 RX ADMIN — POVIDONE-IODINE 15 ML: 10 SOLUTION TOPICAL at 11:55

## 2020-06-06 ASSESSMENT — ENCOUNTER SYMPTOMS
HEADACHES: 0
SORE THROAT: 0
NECK PAIN: 0
DEPRESSION: 0
COUGH: 0
SHORTNESS OF BREATH: 0
INSOMNIA: 0
CHILLS: 1
VOMITING: 0
DIZZINESS: 0
NAUSEA: 0
TINGLING: 0
BLURRED VISION: 0
EYE PAIN: 0
FEVER: 0
ABDOMINAL PAIN: 0
PALPITATIONS: 0
FLANK PAIN: 0
BACK PAIN: 0

## 2020-06-06 ASSESSMENT — PAIN SCALES - GENERAL: PAIN_LEVEL: 0

## 2020-06-06 NOTE — ANESTHESIA TIME REPORT
Anesthesia Start and Stop Event Times     Date Time Event    6/6/2020 0950 Ready for Procedure     0953 Anesthesia Start     1127 Anesthesia Stop        Responsible Staff  06/06/20    Name Role Begin End    Herve Dominguez M.D. Anesth 0953 1127        Preop Diagnosis (Free Text):  Pre-op Diagnosis     right ureteral stone        Preop Diagnosis (Codes):    Post op Diagnosis  Bladder stone      Premium Reason  E. Weekend    Comments: Emergency

## 2020-06-06 NOTE — ANESTHESIA TIME REPORT
Anesthesia Start and Stop Event Times     Date Time Event    6/5/2020 1641 Ready for Procedure     1641 Anesthesia Start     1922 Anesthesia Stop        Responsible Staff  06/05/20    Name Role Begin End    Mena Andersen M.D. Anesth 1641 1922        Preop Diagnosis (Free Text):  Pre-op Diagnosis             Preop Diagnosis (Codes):    Post op Diagnosis  Bladder stone      Premium Reason  A. 3PM - 7AM    Comments:

## 2020-06-06 NOTE — OR NURSING
0952: Patient to preop, allergies and NPO status verified, home medications reconciled, belongings secured, verbalizes understanding of pain scale, surgical site verified, IV access established, SCDs in place, triple aim completed.

## 2020-06-06 NOTE — PROGRESS NOTES
[facility] Medicine Daily Progress Note    Date of Service  6/6/2020    Chief Complaint  44 y.o. female admitted 6/3/2020 with flank pain.     Hospital Course    She was found to have a complicated UTI with sepsis. This was due to an indwelling stent and sanchez with right sided hydronephrosis. She was placed onto IV antibiotics and sepsis protocols. Urology was emergently consulted for stent removal. She underwent  A cystoscopy and stent removal with laser lithotripsy of a 2.5 cm stone on her stent and an 8cm bladder stone on 6/5/20 with stent replacement. She was taken back to surgery on 6/6/20 to remove residual bladder stone fragments on 6/620        Interval Problem Update  6/4- Vitals stable over night. Lactic acid improving. I reviewed her imaging below.   6/5- pending urology recommendations and procedure. Vitals stable and afebrile. She if tired but says her pain is gone.   6/6- I discussed her with urology today. She went back to surgery for a repeat lithotripsy.     KUB  1.  Moderate stool in the colon suggests changes of constipation, otherwise nonspecific bowel gas pattern  2.  Nephrolithiasis  3.  Bladder stones along the urinary stent.    US renal  1.  Moderate to severe right hydronephrosis and calyceal dilatation  2.  Echogenic liver compatible fatty change versus fibrosis.    Prolonged services from 940-1013am today.     Consultants/Specialty  urology    Code Status  full    Disposition  tbd    Review of Systems  Review of Systems   Constitutional: Positive for chills and malaise/fatigue. Negative for fever.   HENT: Negative for sore throat.    Eyes: Negative for blurred vision and pain.   Respiratory: Negative for cough and shortness of breath.    Cardiovascular: Negative for chest pain and palpitations.   Gastrointestinal: Negative for abdominal pain, nausea and vomiting.   Genitourinary: Negative for dysuria, flank pain and urgency.   Musculoskeletal: Negative for back pain and neck pain.   Skin:  Negative for itching and rash.   Neurological: Negative for dizziness, tingling and headaches.   Psychiatric/Behavioral: Negative for depression. The patient does not have insomnia.    All other systems reviewed and are negative.       Physical Exam  Temp:  [36.7 °C (98 °F)-36.9 °C (98.4 °F)] 36.8 °C (98.3 °F)  Pulse:  [61-85] 61  Resp:  [12-20] 18  BP: (114-177)/(61-88) 114/61  SpO2:  [91 %-100 %] 91 %    Physical Exam  Vitals signs and nursing note reviewed.   Constitutional:       General: She is not in acute distress.     Appearance: She is well-developed. She is not diaphoretic.   HENT:      Right Ear: External ear normal.      Left Ear: External ear normal.      Nose: Nose normal.   Eyes:      General:         Right eye: No discharge.         Left eye: No discharge.      Conjunctiva/sclera: Conjunctivae normal.   Neck:      Vascular: No JVD.   Cardiovascular:      Rate and Rhythm: Regular rhythm.      Heart sounds: Normal heart sounds. No murmur.   Pulmonary:      Effort: Pulmonary effort is normal. No respiratory distress.      Breath sounds: Normal breath sounds. No stridor. No wheezing or rales.   Abdominal:      General: Bowel sounds are normal. There is no distension.      Palpations: Abdomen is soft.      Tenderness: There is abdominal tenderness.      Comments: cvat   Musculoskeletal:         General: No tenderness.   Skin:     General: Skin is warm and dry.      Findings: No erythema.   Neurological:      Mental Status: She is alert and oriented to person, place, and time.   Psychiatric:         Behavior: Behavior normal.         Fluids    Intake/Output Summary (Last 24 hours) at 6/6/2020 1010  Last data filed at 6/6/2020 0625  Gross per 24 hour   Intake 1504.17 ml   Output 1000 ml   Net 504.17 ml       Laboratory  Recent Labs     06/04/20  0000 06/05/20  0824 06/06/20  0529   WBC 11.7* 8.3 16.0*   RBC 4.55 4.63 4.71   HEMOGLOBIN 11.7* 11.7* 12.0   HEMATOCRIT 36.5* 37.6 37.7   MCV 80.2* 81.2* 80.0*    MCH 25.7* 25.3* 25.5*   MCHC 32.1* 31.1* 31.8*   RDW 43.3 43.3 41.9   PLATELETCT 381 335 376   MPV 9.1 9.1 8.8*     Recent Labs     06/04/20  0000 06/05/20  0824 06/06/20  0529   SODIUM 139 140 138   POTASSIUM 4.1 4.0 4.5   CHLORIDE 105 105 104   CO2 24 25 23   GLUCOSE 147* 92 137*   BUN 18 12 12   CREATININE 1.08 0.80 0.84   CALCIUM 8.4 8.1* 8.2*     Recent Labs     06/04/20  0000   INR 0.86*               Imaging  DX-PORTABLE FLUORO > 1 HOUR   Final Result      Intraoperative fluoroscopic spot images as described above.      [id number] VIEW   Final Result         1.  Moderate stool in the colon suggests changes of constipation, otherwise nonspecific bowel gas pattern   2.  Nephrolithiasis   3.  Bladder stones along the urinary stent.      US-RENAL   Final Result         1.  Moderate to severe right hydronephrosis and calyceal dilatation   2.  Echogenic liver compatible fatty change versus fibrosis.      DX-CYSTO FLUORO > 1 HOUR    (Results Pending)   [id number] VIEW    (Results Pending)        Assessment/Plan  * Complicated UTI (urinary tract infection)- (present on admission)  Assessment & Plan  With sepsis, due to indwelling stent present over a year. With right sided severe hydronephrosis.   Urinalysis positive with negative to date cultures. Blood cultures negative to date.   Continue ceftriaxone 2 grams IV every 24 hours.  S/p laser litho and stent replacement with urology      Sepsis (HCC)- (present on admission)  Assessment & Plan  This is Sepsis Present on admission  SIRS criteria identified on my evaluation include: Tachycardia, with heart rate greater than 90 BPM  Source is UTI.  Sepsis protocol initiated  Fluid resuscitation ordered per protocol  IV antibiotics as appropriate for source of sepsis  While organ dysfunction may be noted elsewhere in this problem list or in the chart, degree of organ dysfunction does not meet CMS criteria for severe sepsis          Nephrolithiasis- (present on  admission)  Assessment & Plan  Had bladder stone and ureteral stone now removed with urology    Hydronephrosis- (present on admission)  Assessment & Plan  Due to stent in place for prolonged amount of time.  S/p surgery       VTE prophylaxis: scd

## 2020-06-06 NOTE — ANESTHESIA POSTPROCEDURE EVALUATION
Patient: Mariana Aguero    Procedure Summary     Date:  06/05/20 Room / Location:   OR 03 / SURGERY [facility]    Anesthesia Start:  1641 Anesthesia Stop:  1922    Procedures:       CYSTOSCOPY, WITH URETERAL STENT INSERTION (Right )      LITHOTRIPSY, USING LASER- CYSTOLITHOLAPAXY FOR BLADDER STONE, right semi rigid ureteroscopy, right stent placement (Right ) Diagnosis:      Surgeon:  Dragan Pitt M.D. Responsible Provider:  Mena Andersen M.D.    Anesthesia Type:  general ASA Status:  2          Final Anesthesia Type: general  Last vitals  BP   Blood Pressure: 145/77    Temp   36.9 °C (98.4 °F)    Pulse   Pulse: 67   Resp   18    SpO2   96 %      Anesthesia Post Evaluation    Patient location during evaluation: PACU  Patient participation: complete - patient participated  Level of consciousness: awake and alert    Airway patency: patent  Anesthetic complications: no  Cardiovascular status: hemodynamically stable  Respiratory status: acceptable  Hydration status: euvolemic    PONV: none           Nurse Pain Score: 0 (NPRS)

## 2020-06-06 NOTE — OP REPORT
DATE OF SERVICE:  06/06/2020    PREOPERATIVE DIAGNOSES:  1.  Residual 1 cm bladder calculus.  2.  An 11 mm right renal calculus.    PROCEDURES:  1.  Cystoscopy with lasertripsy of bladder calculus and extraction of   fragments.  2.  Right ureteroscopy with lasertripsy of right renal calculus.  3.  Removal of right ureteral stent.    ANESTHESIOLOGIST:  Herve Dominguez MD    SURGEON:  Ray Benavides MD    BRIEF HISTORY:  This 44-year-old female had presented to a hospital in this   area  approximately 15 months ago.  She was septic and was found to have an 11   mm obstructing right renal stone.  At that time, she had a ureteral stent   placed, but then failed to follow up, as she apparently moved to [place].  She   recently, apparently is back in [facility] and presented to the emergency   room several nights ago with ongoing bladder pain.  She was admitted to the   hospital and found to have an 8 cm bladder stone on the distal end of the   ureteral stent.  She also apparently had some upper ureteral stones on the   stent and an 11 mm calculus in the right kidney.  Dr. Dragan Pitt took her to   the operating room yesterday where he removed most of the bladder stone and a   lot of fragments on the stent.  He was then able to extract the stent.  At   this point, a new stent was replaced.  At that time, because of poor   visibility from bleeding from marked inflammation of the bladder, ureter and   kidney, the procedure was terminated.  She returns today for followup   procedure to try and clear her stones.    REPORT OF OPERATION:  Under general anesthesia with the patient in the   lithotomy position, the genitalia were prepped and draped in the usual manner.    The 21-[place] cystoscope was introduced into the bladder.  Ureteral stent   was identified and the stone fragment was identified that was approximately 1   cm in diameter.  I tried to extract it with a grasper, but it was just too   large to go  through the urethra.  At this point, utilizing 200 micron fiber,   power settings were initially 30 Hz and 0.2 joules.  This was turned up to 0.5   joules to fragment the stone, which was done.  I then used the grasper to   remove the larger fragments.  All other fragments were small and irrigated out   easily.  There were still several pieces of dust attached to the bladder wall   throughout the bladder.  Bladder was extremely inflamed and edematous from   this stone that had been in there.    Next, the ureteroscope was grasped and pulled outside the ureter.  I passed   the ZIPwire up through the stent to the right kidney without difficulty.  I   passed the disposable flexible ureteroscope over the wire to the kidney.  At   this point, the ureter was quite dilated and I did see a few fragments on the   way up.  Visibility in the kidney was quite poor.  At this point, replaced the   ZIPwire through the scope and removed the scope.  I then passed a 46 cm long   ureteral access sheath over the wire up into the upper ureter and kidney.  The   ureteroscope was then replaced up the ureter and examination of the kidney   was somewhat poor because of mild oozing from all the inflammation, but I did   eventually find this main stone in question.  There were several smaller   fragments.  At this point, I commenced fragmenting this larger stone with the   prior settings.  It did break up quite well and I is able to get all fragments   to be less than a millimeter in greatest diameter.  Also, the volume of the   stone had been greatly reduced.    Examination of the kidney did show several other fragments and these were all   broken up.  At the end of the procedure, complete examination of the kidney   failed to show any other significant fragments and all the ones that were left   should pass readily.  I then slowly removed the ureteroscope down the ureter.    I had seen several fragments earlier on the way up on initial exam,  but now   the ureter was quite clean all the way down to the bottom.  This was then   removed.  The cystoscope was reintroduced into the bladder and there was urine   noted to be emanating from this right ureteral orifice.  Again, all I saw in   the bladder was just several small fragments that should pass out readily.  In   addition, the bladder was extremely edematous and hyperemic.  At this point,   as the ureter was quite dilated from the long-term stent.  I elected not to   place a ureteral stent in this patient.  The bladder, at this point, was   drained.  The cystoscope removed.  She was cleaned up, woken up, and   transferred to the PACU in stable condition.       ____________________________________     MD CHRISTIANA NI / ELLIS    DD:  06/06/2020 11:34:21  DT:  06/06/2020 12:10:01    D#:  [id number]  Job#:  [id number]

## 2020-06-06 NOTE — CARE PLAN
Problem: Safety  Goal: Will remain free from falls  Outcome: PROGRESSING AS EXPECTED     Problem: Bowel/Gastric:  Goal: Normal bowel function is maintained or improved  Outcome: PROGRESSING AS EXPECTED     Problem: Discharge Barriers/Planning  Goal: Patient's continuum of care needs will be met  Outcome: PROGRESSING AS EXPECTED     Problem: Pain Management  Goal: Pain level will decrease to patient's comfort goal  Outcome: PROGRESSING AS EXPECTED

## 2020-06-06 NOTE — OR SURGEON
Immediate Post OP Note    PreOp Diagnosis: Residual bladder calculus and 11 mm right renal calculus    PostOp Diagnosis: same    Procedure(s):  CYSTOSCOPY, laser lith bladder stone  Removal of right ureteral stent Wound Class: None  URETEROSCOPY LASER LITHO OF RESIDUAL BLADDER Stone and  laser right renal stone - Wound Class: None    Surgeon(s):  Ray Benavides M.D.    Anesthesiologist/Type of Anesthesia:  Anesthesiologist: Herve Dominguez M.D./General    Surgical Staff:  Circulator: Yasemin Hanson R.N.  Scrub Person: Perez Iglesias    Specimens removed if any:  * No specimens in log *    Estimated Blood Loss: none    Findings: Residual 1 cm bladder stone and 11 mm right renal calculus  Total fragmentation of bladder stone and removal of larger fragments.  Good and total fragmentation of right renal calculus .  Removal of rt ureteral stent.  No stone fragments in ureter with ureteroscope extraction.    Complications: Stable to PACU        6/6/2020 11:19 AM Ray Benavides M.D.

## 2020-06-06 NOTE — ANESTHESIA PROCEDURE NOTES
Airway    Date/Time: 6/5/2020 4:51 PM  Performed by: Mena Andersen M.D.  Authorized by: Mena Andersen M.D.     Location:  OR  Urgency:  Elective  Indications for Airway Management:  Anesthesia      Spontaneous Ventilation: absent    Sedation Level:  Deep  Preoxygenated: Yes    Mask Difficulty Assessment:  0 - not attempted  Final Airway Type:  Supraglottic airway  Final Supraglottic Airway:  Standard LMA    SGA Size:  4  Number of Attempts at Approach:  1

## 2020-06-06 NOTE — ANESTHESIA PREPROCEDURE EVALUATION
POD1 from CULTS. Residual stone.     Denies angina, dyspnea, GERD.     Did well with anesthesia yesterday.     Relevant Problems      (+) Hydronephrosis   (+) Nephrolithiasis      Other   (+) Complicated UTI (urinary tract infection)   (+) Sepsis (HCC)   (+) Shock (HCC)   (+) Tobacco abuse       Physical Exam    Airway   Mallampati: II  TM distance: >3 FB  Neck ROM: full       Cardiovascular - normal exam  Rhythm: regular  Rate: normal  (-) murmur     Dental - normal exam  (+) upper dentures, lower dentures      Very poor dentition   Pulmonary - normal exam  Breath sounds clear to auscultation     Abdominal    Neurological - normal exam                 Anesthesia Plan    ASA 3- EMERGENT   ASA physical status 3 criteria: alcohol and/or substance dependence or abuseASA physical status emergent criteria: sepsis and acutely contaminated wound or identified infection source    Plan - general       Airway plan will be LMA        Induction: intravenous    Postoperative Plan: Postoperative administration of opioids is intended.    Pertinent diagnostic labs and testing reviewed    Informed Consent:    Anesthetic plan and risks discussed with patient.    Use of blood products discussed with: patient whom consented to blood products.

## 2020-06-06 NOTE — OR NURSING
1119: Patient to PACU from OR.  Awake but drowsy, can answer questions and respond.  Reports no pain.  Patient shivering a little bit, will warm and then reassess.   1130: Dr. Kolb at bedside explaining results to patient.  Reports patient has an order for pyridium and would like her to have a dose in PACU.    Patient still shivering some after being warmed, will treat with demerol.    1145: Patient able to take sips of water without nausea, resting in bed with eyes closed.   1200: Monitoring to ensure blood pressure remains below 170 to ensure no treatment is needed before return to floor.    1215: Patient meets criteria for transfer to room, called report to LUPE Nielsen.  1227: Patient transported to room.

## 2020-06-06 NOTE — PROGRESS NOTES
Pt A&Ox4. Vss. Pt c/o 6/10 pain in urethra. Medicated per MAR. Pt has voided post-op. Urine is bloody. RN discussed POC with pt and importance to remain NPO at this time. Pt verbalizes understanding. No other needs at this time.

## 2020-06-06 NOTE — OR NURSING
"1921 To PACU from OR via bed,side rails up x 4 for safety, lungs clear bilaterally, scds on patient and machine operational, pt arouses briefly to voice but eyes close quickly. Pt denies pain or nausea. HOB elevated 20 degrees.   1925 Dr Pitt   1930 Pt arouses to voice and reports pain to ureter; unable to answer any more questions about quality or severity. \"I'm not sure\" and eyes close.   1945 Pt awake and asking questions about procedure and asked if she had a stent. Pt becomes upset immediately and concerned about when stent is being removed. Pt rolled onto right side and not talking with RN but eyes remain open.   1950 Call to Dr Pitt to obtain information for patient regarding stent removal and plan of care. Dr Pitt reports having detailed conversation in pre-op with patient to explain need for extensive care and additional stent. Dr Pitt asked this RN to attempt to re-iterate the plan of care and stress the importance of staying in the hospital to complete the procedures. Pt educated to remain NPO until further notice; pt upset but states verbal understanding.   2000 Discussion with patient regarding Dr Pitt conversation and this RN attempted to sympathize but also educate and encourage patient to complete care in hospital. Patient encouraged to speak with day RN tomorrow and talk with  to assist with financial stress and issues that she is so concerned about.   2010 Transfer patient back to her room as she requested.     "

## 2020-06-06 NOTE — ANESTHESIA QCDR
2019 [facility] Clinical Data Registry (for Quality Improvement)     Postoperative nausea/vomiting risk protocol (Adult = 18 yrs and Pediatric 3-17 yrs)- (430 and 463)  General inhalation anesthetic (NOT TIVA) with PONV risk factors: Yes  Provision of anti-emetic therapy with at least 2 different classes of agents: Yes   Patient DID NOT receive anti-emetic therapy and reason is documented in Medical Record:  N/A    Multimodal Pain Management- (477)  Non-emergent surgery AND patient age >= 18: Yes  Use of Multimodal Pain Management, two or more drugs and/or interventions, NOT including systemic opioids: Yes  Exception: Documented allergy to multiple classes of analgesics: N/A    Smoking Abstinence (404)  Patient is current smoker (cigarette, pipe, e-cig, marijuanna): Yes  Elective Surgery: Yes  Abstinence instructions provided prior to day of surgery: Yes  Patient abstained from smoking on day of surgery: Yes    Pre-Op Beta-Blocker in Isolated CABG (44)  Isolated CABG AND patient age >= 18: No  Beta-blocker admin within 24 hours of surgical incision:   Exception:of medical reason(s) for not administering beta blocker within 24 hours prior to surgical incision (e.g., not  indicated,other medical reason):     PACU assessment of acute postoperative pain prior to Anesthesia Care End- Applies to Patients Age = 18- (ABG7)  Initial PACU pain score is which of the following: < 7/10  Patient unable to report pain score: N/A    Post-anesthetic transfer of care checklist/protocol to PACU/ICU- (426 and 427)  Upon conclusion of case, patient transferred to which of the following locations: PACU/Non-ICU  Use of transfer checklist/protocol: Yes  Exclusion: Service Performed in Patient Hospital Room (and thus did not require transfer): N/A  Unplanned admission to ICU related to anesthesia service up through end of PACU care- (MD51)  Unplanned admission to ICU (not initially anticipated at anesthesia start time): No

## 2020-06-06 NOTE — ANESTHESIA PROCEDURE NOTES
Airway    Date/Time: 6/6/2020 10:06 AM  Performed by: Herve Dominguez M.D.  Authorized by: Herve Dominguez M.D.     Location:  OR  Urgency:  Elective  Indications for Airway Management:  Anesthesia      Spontaneous Ventilation: absent    Sedation Level:  Deep  Preoxygenated: Yes    Mask Difficulty Assessment:  0 - not attempted  Final Airway Type:  Supraglottic airway  Final Supraglottic Airway:  Standard LMA    SGA Size:  4  Number of Attempts at Approach:  1

## 2020-06-06 NOTE — DISCHARGE PLANNING
Attempted to meet with pt multiple times to discuss transportation and confirm location she is discharging to. Pt sleeping throughout the day post procedure. Will re attempt at another time.

## 2020-06-06 NOTE — PROGRESS NOTES
Urology  post op note.    Good fragmentation of all stone fragments. Removal of larger bladder stones fragments.  Very inflamed bladder and right renal pelvis.  No residual stone fragments in right ureter with removal of ureteroscope. Stent removed and not replaced.    Ok for discharge in am on several weeks of antibiotic.  Can f/U in our office in several weeks.  Will follow until discharged from hospital.    Ray Benavides MD

## 2020-06-06 NOTE — DISCHARGE PLANNING
Agency/Facility Name: [facility] [phone]  Spoke To: Jarome   Outcome: Unable to find patient in system, patient does not have transportation with [facility].

## 2020-06-06 NOTE — PROGRESS NOTES
Pt with residual stone in bladder and rt kidney.  To take back to OR today for ureteroscopy and lasertripsy of rt renal and bladder calculus. Risks and benefits discussed.  Patient agrees to procedure.    Ray Oconnor MD

## 2020-06-06 NOTE — OR SURGEON
Immediate Post OP Note    PreOp Diagnosis: Encrusted right stent                                8cm bladder stone on distal stent                                2.5cm ureteral stone on stent  PostOp Diagnosis: As above    Procedure(s):  CYSTOSCOPY  LASER LITHOTRIPSY OF 8 cm BLADDER STONE  RIGHT URETEROSCOPY WITH LASER LITHOTRIPSY OF 2.5 cm URETERAL STONE ON STENT  RIGHT STENT REMOVAL   RIGHT STENT PLACEMENT 6 [place] of [place] x 22 cm    Surgeon(s):  Dragan Pitt M.D.    Anesthesiologist/Type of Anesthesia:  Anesthesiologist: Mena Andersen M.D./General LMA    Surgical Staff:  Circulator: Kandi Cantor R.N.  Scrub Person: Vinnie Cazares  Radiology Technologist: Dara Mederos    Specimens removed if any:  ID Type Source Tests Collected by Time Destination   A : right kidney stone Other Other PATHOLOGY SPECIMEN Dragan Pitt M.D. 6/5/2020 1909        Estimated Blood Loss:50ml    Findings: Bladder stone 8cm attached to stent                  Proximal ureteral stone on stent 2.5 cm                  Residual  Bladder stone fragment                   12 mm Right renal stone    Complications:NOne        6/5/2020 7:10 PM Dragan Pitt M.D.

## 2020-06-06 NOTE — PROGRESS NOTES
Received report, assumed pt care. Discussed POC. Pt voiding frequently with hematuria and dysuria. Pt has been npo since midnight for procedure today. VSS. Will cont to monitor.

## 2020-06-06 NOTE — ANESTHESIA POSTPROCEDURE EVALUATION
Patient: Mariana Aguero    Procedure Summary     Date:  06/06/20 Room / Location:   OR 01 / SURGERY [facility]    Anesthesia Start:  0953 Anesthesia Stop:  1127    Procedures:       CYSTOSCOPY, laser lith bladder stone (Right Ureter)      URETEROSCOPY LASER LITHO OF RESIDUAL BLADDER STONE. laser right renal stone (Right Ureter) Diagnosis:  (right ureteral stone)    Surgeon:  Ray Benavides M.D. Responsible Provider:  Herve Dominguez M.D.    Anesthesia Type:  general ASA Status:  3 - Emergent          Final Anesthesia Type: general  Last vitals  BP   Blood Pressure: (!) 178/91    Temp   36 °C (96.8 °F)    Pulse   Pulse: 79   Resp   16    SpO2   90 %      Anesthesia Post Evaluation    Patient location during evaluation: PACU  Patient participation: complete - patient participated  Level of consciousness: awake and alert  Pain score: 0    Airway patency: patent  Anesthetic complications: no  Cardiovascular status: hemodynamically stable  Respiratory status: acceptable  Hydration status: euvolemic    PONV: none           Nurse Pain Score: 0 (NPRS)

## 2020-06-07 PROCEDURE — 700111 HCHG RX REV CODE 636 W/ 250 OVERRIDE (IP): Performed by: HOSPITALIST

## 2020-06-07 PROCEDURE — 700105 HCHG RX REV CODE 258: Performed by: HOSPITALIST

## 2020-06-07 PROCEDURE — 99232 SBSQ HOSP IP/OBS MODERATE 35: CPT | Performed by: HOSPITALIST

## 2020-06-07 PROCEDURE — A9270 NON-COVERED ITEM OR SERVICE: HCPCS | Performed by: HOSPITALIST

## 2020-06-07 PROCEDURE — 770006 HCHG ROOM/CARE - MED/SURG/GYN SEMI*

## 2020-06-07 PROCEDURE — A9270 NON-COVERED ITEM OR SERVICE: HCPCS | Performed by: UROLOGY

## 2020-06-07 PROCEDURE — 700102 HCHG RX REV CODE 250 W/ 637 OVERRIDE(OP): Performed by: UROLOGY

## 2020-06-07 PROCEDURE — 700102 HCHG RX REV CODE 250 W/ 637 OVERRIDE(OP): Performed by: HOSPITALIST

## 2020-06-07 RX ORDER — CIPROFLOXACIN 500 MG/1
500 TABLET, FILM COATED ORAL 2 TIMES DAILY
Qty: 28 TAB | Refills: 0 | Status: SHIPPED | OUTPATIENT
Start: 2020-06-07 | End: 2020-06-21

## 2020-06-07 RX ADMIN — OXYCODONE HYDROCHLORIDE 10 MG: 5 TABLET ORAL at 20:42

## 2020-06-07 RX ADMIN — ONDANSETRON 4 MG: 4 TABLET, ORALLY DISINTEGRATING ORAL at 05:26

## 2020-06-07 RX ADMIN — OXYCODONE HYDROCHLORIDE 10 MG: 5 TABLET ORAL at 13:39

## 2020-06-07 RX ADMIN — PROMETHAZINE HYDROCHLORIDE 25 MG: 25 TABLET ORAL at 08:43

## 2020-06-07 RX ADMIN — OXYCODONE HYDROCHLORIDE 10 MG: 5 TABLET ORAL at 06:04

## 2020-06-07 RX ADMIN — SODIUM CHLORIDE, POTASSIUM CHLORIDE, SODIUM LACTATE AND CALCIUM CHLORIDE: 600; 310; 30; 20 INJECTION, SOLUTION INTRAVENOUS at 17:13

## 2020-06-07 RX ADMIN — CEFTRIAXONE SODIUM 2 G: 2 INJECTION, POWDER, FOR SOLUTION INTRAMUSCULAR; INTRAVENOUS at 05:18

## 2020-06-07 RX ADMIN — ACETAMINOPHEN 650 MG: 325 TABLET, FILM COATED ORAL at 20:42

## 2020-06-07 RX ADMIN — PHENAZOPYRIDINE HYDROCHLORIDE 100 MG: 200 TABLET ORAL at 08:01

## 2020-06-07 RX ADMIN — KETOROLAC TROMETHAMINE 15 MG: 30 INJECTION, SOLUTION INTRAMUSCULAR at 11:48

## 2020-06-07 RX ADMIN — PHENAZOPYRIDINE HYDROCHLORIDE 100 MG: 200 TABLET ORAL at 17:02

## 2020-06-07 RX ADMIN — OXYCODONE HYDROCHLORIDE 5 MG: 5 TABLET ORAL at 00:19

## 2020-06-07 RX ADMIN — DOCUSATE SODIUM - SENNOSIDES 2 TABLET: 50; 8.6 TABLET, FILM COATED ORAL at 17:02

## 2020-06-07 RX ADMIN — OXYCODONE HYDROCHLORIDE 10 MG: 5 TABLET ORAL at 17:08

## 2020-06-07 RX ADMIN — DOCUSATE SODIUM - SENNOSIDES 2 TABLET: 50; 8.6 TABLET, FILM COATED ORAL at 05:22

## 2020-06-07 RX ADMIN — SODIUM CHLORIDE, POTASSIUM CHLORIDE, SODIUM LACTATE AND CALCIUM CHLORIDE: 600; 310; 30; 20 INJECTION, SOLUTION INTRAVENOUS at 06:38

## 2020-06-07 RX ADMIN — ONDANSETRON 4 MG: 2 INJECTION INTRAMUSCULAR; INTRAVENOUS at 11:48

## 2020-06-07 RX ADMIN — OXYCODONE HYDROCHLORIDE 10 MG: 5 TABLET ORAL at 23:47

## 2020-06-07 RX ADMIN — HYDROMORPHONE HYDROCHLORIDE 0.5 MG: 1 INJECTION, SOLUTION INTRAMUSCULAR; INTRAVENOUS; SUBCUTANEOUS at 05:09

## 2020-06-07 RX ADMIN — HYDROMORPHONE HYDROCHLORIDE 0.5 MG: 1 INJECTION, SOLUTION INTRAMUSCULAR; INTRAVENOUS; SUBCUTANEOUS at 08:48

## 2020-06-07 ASSESSMENT — ENCOUNTER SYMPTOMS
BACK PAIN: 0
DIZZINESS: 0
BLURRED VISION: 0
INSOMNIA: 0
FEVER: 0
FLANK PAIN: 0
CHILLS: 1
VOMITING: 0
TINGLING: 0
NECK PAIN: 0
COUGH: 0
NAUSEA: 0
HEADACHES: 0
EYE PAIN: 0
SHORTNESS OF BREATH: 0
ABDOMINAL PAIN: 0
SORE THROAT: 0
PALPITATIONS: 0
DEPRESSION: 0

## 2020-06-07 NOTE — PROGRESS NOTES
Report received from LUPE Nielsen.  Pt AAOx4. C/o pain in the pelvis area 9/10. Medicated with oxycodone per MAR.  Denies any nausea, dizziness, SOB, chills, but reports dysuria and hematuria. POC discussed with pt including medication and pain management, diet was now on regular, safety, oral care, possible d/c tomorrow. Pt verbalized understanding. Questions and concerns answered. Complete bed linen change tonight. Pt had blood stains on linens. Pt ambulated  and voided in the bathroom and now back to bed, gait steady. Safety and comfort measures in place. Snack provided per pt request. No additional needs at this time.

## 2020-06-07 NOTE — PROGRESS NOTES
Urology    Some bladder pain but has very irritated bladder from long term large stone in bladder.    At this point stone is treated.  No stent in place.  Advised to drink 3 L fluid per day.  Lives in [place] and says has PCP there. Will have f/u with PCP there.      Home on 2 weeks of bactrim.  Will sign off at this point.    Ray Oconnor MD

## 2020-06-07 NOTE — PROGRESS NOTES
0509 Pt was crying and reports severe pain 10/10 in lower abd area during morning assessment. Also c/o of nausea, but no vomiting. Medicated w/ IV dilaudid and oral zofran.     0604 Pt medicated again with oral oxycodone after c/o pain 9/10 after almost an hour from IV pain med.  Will continue to monitor.     0639 Pt now resting and feeling better. Agreed to run IV fluid again. New IV fluid hang.

## 2020-06-07 NOTE — DISCHARGE SUMMARY
Discharge Summary    CHIEF COMPLAINT ON ADMISSION  No chief complaint on file.      Reason for Admission  kidney pain, back pain     Admission Date  6/3/2020    CODE STATUS  Full Code    HPI & HOSPITAL COURSE  This is a 44 y.o. female here with abdominal pain.     She was found to have a complicated UTI with sepsis. This was due to an indwelling stent and sanchez with right sided hydronephrosis. She was supposed to follow up for this stent to be removed 2 weeks after it was placed a year ago but had not done so. She was placed onto IV antibiotics and sepsis protocols. Urology was emergently consulted for stent removal. She underwent  A cystoscopy and stent removal with laser lithotripsy of a 2.5 cm stone on her stent and an 8cm bladder stone on 6/5/20 with stent replacement. She was taken back to surgery on 6/6/20 to remove residual bladder stone fragments on 6/620   She will complete a course of antibiotics at home and follow up with her PCP. She did not have a stent replaced due to her lack of follow up in the past.     Therefore, she is discharged in good and stable condition to home with close outpatient follow-up.    The patient met 2-midnight criteria for an inpatient stay at the time of discharge.    Discharge Date  6/8/20    FOLLOW UP ITEMS POST DISCHARGE  pcp    DISCHARGE DIAGNOSES  Principal Problem:    Complicated UTI (urinary tract infection) POA: Yes  Active Problems:    Nephrolithiasis POA: Yes      Overview: IMO load March 2020    Sepsis (HCC) POA: Yes    Hydronephrosis POA: Yes  Resolved Problems:    * No resolved hospital problems. *      FOLLOW UP  No future appointments.  Dragan Pitt M.D.  [address]  [place] [address]  [phone]    Schedule an appointment as soon as possible for a visit in 1 week        MEDICATIONS ON DISCHARGE     Medication List      START taking these medications      Instructions   ciprofloxacin 500 MG Tabs  Commonly known as:  CIPRO   Take 1 Tab by mouth 2 times a day  for 14 days.  Dose:  500 mg     ondansetron 4 MG Tbdp  Commonly known as:  ZOFRAN ODT   Take 1 Tab by mouth every 6 hours as needed for Nausea.  Dose:  4 mg     phenazopyridine 200 MG Tabs  Commonly known as:  PYRIDIUM   Take 1 Tab by mouth 3 times a day as needed for up to 6 days.  Dose:  200 mg     tamsulosin 0.4 MG capsule  Commonly known as:  FLOMAX   Take 1 Cap by mouth every day for 6 days.  Dose:  0.4 mg        CONTINUE taking these medications      Instructions   ibuprofen 200 MG Tabs  Commonly known as:  MOTRIN   Take 400 mg by mouth every 6 hours as needed for Mild Pain.  Dose:  400 mg            Allergies  Allergies   Allergen Reactions   • Penicillins Unspecified     Childhood        DIET  Orders Placed This Encounter   Procedures   • Diet Order Regular     Standing Status:   Standing     Number of Occurrences:   1     Order Specific Question:   Diet:     Answer:   Regular [1]       ACTIVITY  As tolerated.  Weight bearing as tolerated    CONSULTATIONS  urology    PROCEDURES  PreOp Diagnosis: Encrusted right stent                                8cm bladder stone on distal stent                                2.5cm ureteral stone on stent  PostOp Diagnosis: As above     Procedure(s):  CYSTOSCOPY  LASER LITHOTRIPSY OF 8 cm BLADDER STONE  RIGHT URETEROSCOPY WITH LASER LITHOTRIPSY OF 2.5 cm URETERAL STONE ON STENT  RIGHT STENT REMOVAL   RIGHT STENT PLACEMENT 6 french x 22 cm     Surgeon(s):  Dragan Pitt M.D.     Anesthesiologist/Type of Anesthesia:  Anesthesiologist: Mena Andersen M.D./General LMA     Surgical Staff:  Circulator: Kandi Cantor R.N.  Scrub Person: Vinnie Cazares  Radiology Technologist: Dara Mederos     Specimens removed if any:  ID Type Source Tests Collected by Time Destination   A : right kidney stone Other Other PATHOLOGY SPECIMEN Dragan Pitt M.D. 6/5/2020 1909           Estimated Blood Loss:50ml     Findings: Bladder stone 8cm attached to stent                  Proximal  ureteral stone on stent 2.5 cm                  Residual  Bladder stone fragment                   12 mm Right renal stone     Complications:NOne      DATE OF SERVICE:  06/06/2020     PREOPERATIVE DIAGNOSES:  1.  Residual 1 cm bladder calculus.  2.  An 11 mm right renal calculus.     PROCEDURES:  1.  Cystoscopy with lasertripsy of bladder calculus and extraction of   fragments.  2.  Right ureteroscopy with lasertripsy of right renal calculus.  3.  Removal of right ureteral stent.     ANESTHESIOLOGIST:  Herve Dominguez MD     SURGEON:  Ray Benavides MD     BRIEF HISTORY:  This 44-year-old female had presented to a hospital in this   area  approximately 15 months ago.  She was septic and was found to have an 11   mm obstructing right renal stone.  At that time, she had a ureteral stent   placed, but then failed to follow up, as she apparently moved to [place].  She   recently, apparently is back in [facility] and presented to the emergency   room several nights ago with ongoing bladder pain.  She was admitted to the   hospital and found to have an 8 cm bladder stone on the distal end of the   ureteral stent.  She also apparently had some upper ureteral stones on the   stent and an 11 mm calculus in the right kidney.  Dr. Dragan Pitt took her to   the operating room yesterday where he removed most of the bladder stone and a   lot of fragments on the stent.  He was then able to extract the stent.  At   this point, a new stent was replaced.  At that time, because of poor   visibility from bleeding from marked inflammation of the bladder, ureter and   kidney, the procedure was terminated.  She returns today for followup   procedure to try and clear her stones.     REPORT OF OPERATION:  Under general anesthesia with the patient in the   lithotomy position, the genitalia were prepped and draped in the usual manner.    The 21-Lao cystoscope was introduced into the bladder.  Ureteral stent   was identified and  the stone fragment was identified that was approximately 1   cm in diameter.  I tried to extract it with a grasper, but it was just too   large to go through the urethra.  At this point, utilizing 200 micron fiber,   power settings were initially 30 Hz and 0.2 joules.  This was turned up to 0.5   joules to fragment the stone, which was done.  I then used the grasper to   remove the larger fragments.  All other fragments were small and irrigated out   easily.  There were still several pieces of dust attached to the bladder wall   throughout the bladder.  Bladder was extremely inflamed and edematous from   this stone that had been in there.     Next, the ureteroscope was grasped and pulled outside the ureter.  I passed   the ZIPwire up through the stent to the right kidney without difficulty.  I   passed the disposable flexible ureteroscope over the wire to the kidney.  At   this point, the ureter was quite dilated and I did see a few fragments on the   way up.  Visibility in the kidney was quite poor.  At this point, replaced the   ZIPwire through the scope and removed the scope.  I then passed a 46 cm long   ureteral access sheath over the wire up into the upper ureter and kidney.  The   ureteroscope was then replaced up the ureter and examination of the kidney   was somewhat poor because of mild oozing from all the inflammation, but I did   eventually find this main stone in question.  There were several smaller   fragments.  At this point, I commenced fragmenting this larger stone with the   prior settings.  It did break up quite well and I is able to get all fragments   to be less than a millimeter in greatest diameter.  Also, the volume of the   stone had been greatly reduced.     Examination of the kidney did show several other fragments and these were all   broken up.  At the end of the procedure, complete examination of the kidney   failed to show any other significant fragments and all the ones that were  left   should pass readily.  I then slowly removed the ureteroscope down the ureter.    I had seen several fragments earlier on the way up on initial exam, but now   the ureter was quite clean all the way down to the bottom.  This was then   removed.  The cystoscope was reintroduced into the bladder and there was urine   noted to be emanating from this right ureteral orifice.  Again, all I saw in   the bladder was just several small fragments that should pass out readily.  In   addition, the bladder was extremely edematous and hyperemic.  At this point,   as the ureter was quite dilated from the long-term stent.  I elected not to   place a ureteral stent in this patient.  The bladder, at this point, was   drained.  The cystoscope removed.  She was cleaned up, woken up, and   transferred to the PACU in stable condition.        ____________________________________     DMITRY MARTINEZ MD             LABORATORY  Lab Results   Component Value Date    SODIUM 139 06/08/2020    POTASSIUM 4.1 06/08/2020    CHLORIDE 104 06/08/2020    CO2 25 06/08/2020    GLUCOSE 98 06/08/2020    BUN 10 06/08/2020    CREATININE 0.89 06/08/2020        Lab Results   Component Value Date    WBC 16.0 (H) 06/08/2020    HEMOGLOBIN 9.7 (L) 06/08/2020    HEMATOCRIT 31.5 (L) 06/08/2020    PLATELETCT 337 06/08/2020        Total time of the discharge process exceeds 34 minutes.

## 2020-06-07 NOTE — DISCHARGE PLANNING
LSW spoke with pt regarding bus ticket. Pt does not have a working debit/credit card. LSW attempted to help pt purchase ticket over the computer but pts card number did not work. LSW informed her that she can pay with cash when she gets to the bus station but LSW can not guarantee there will be a seat. LSW informed her it would be best to see if a family member/friend could buy her ticket over the phone/computer. Pt stated she would call her friend.     TESSAW updated by RN that pt has a ticket. TESSAW stated that she would provide a cab voucher for [facility] in early in the morning.     Cab voucher left on pts chart

## 2020-06-07 NOTE — PROGRESS NOTES
[facility] Medicine Daily Progress Note    Date of Service  6/7/2020    Chief Complaint  44 y.o. female admitted 6/3/2020 with flank pain.     Hospital Course    She was found to have a complicated UTI with sepsis. This was due to an indwelling stent and sanchez with right sided hydronephrosis. She was placed onto IV antibiotics and sepsis protocols. Urology was emergently consulted for stent removal. She underwent  A cystoscopy and stent removal with laser lithotripsy of a 2.5 cm stone on her stent and an 8cm bladder stone on 6/5/20 with stent replacement. She was taken back to surgery on 6/6/20 to remove residual bladder stone fragments on 6/620        Interval Problem Update  6/4- Vitals stable over night. Lactic acid improving. I reviewed her imaging below.   6/5- pending urology recommendations and procedure. Vitals stable and afebrile. She if tired but says her pain is gone.   6/6- I discussed her with urology today. She went back to surgery for a repeat lithotripsy.   6/7- I discussed her with urology today. Still having pain. Arranging for pain control today and discussed length of antibiotics with urology.       KUB  1.  Moderate stool in the colon suggests changes of constipation, otherwise nonspecific bowel gas pattern  2.  Nephrolithiasis  3.  Bladder stones along the urinary stent.    US renal  1.  Moderate to severe right hydronephrosis and calyceal dilatation  2.  Echogenic liver compatible fatty change versus fibrosis.    Prolonged services from 940-1013am today.     Consultants/Specialty  urology    Code Status  full    Disposition  tbd    Review of Systems  Review of Systems   Constitutional: Positive for chills and malaise/fatigue. Negative for fever.   HENT: Negative for sore throat.    Eyes: Negative for blurred vision and pain.   Respiratory: Negative for cough and shortness of breath.    Cardiovascular: Negative for chest pain and palpitations.   Gastrointestinal: Negative for abdominal pain,  nausea and vomiting.   Genitourinary: Negative for dysuria, flank pain and urgency.   Musculoskeletal: Negative for back pain and neck pain.   Skin: Negative for itching and rash.   Neurological: Negative for dizziness, tingling and headaches.   Psychiatric/Behavioral: Negative for depression. The patient does not have insomnia.    All other systems reviewed and are negative.       Physical Exam  Temp:  [36 °C (96.8 °F)-36.9 °C (98.5 °F)] 36.7 °C (98.1 °F)  Pulse:  [63-84] 69  Resp:  [16-18] 18  BP: (134-178)/(70-99) 134/71  SpO2:  [90 %-100 %] 98 %    Physical Exam  Vitals signs and nursing note reviewed.   Constitutional:       General: She is not in acute distress.     Appearance: She is well-developed. She is not diaphoretic.   HENT:      Right Ear: External ear normal.      Left Ear: External ear normal.      Nose: Nose normal.   Eyes:      General:         Right eye: No discharge.         Left eye: No discharge.      Conjunctiva/sclera: Conjunctivae normal.   Neck:      Vascular: No JVD.   Cardiovascular:      Rate and Rhythm: Regular rhythm.      Heart sounds: Normal heart sounds. No murmur.   Pulmonary:      Effort: Pulmonary effort is normal. No respiratory distress.      Breath sounds: Normal breath sounds. No stridor. No wheezing or rales.   Abdominal:      General: Bowel sounds are normal. There is no distension.      Palpations: Abdomen is soft.      Tenderness: There is abdominal tenderness.      Comments: cvat   Musculoskeletal:         General: No tenderness.   Skin:     General: Skin is warm and dry.      Findings: No erythema.   Neurological:      Mental Status: She is alert and oriented to person, place, and time.   Psychiatric:         Behavior: Behavior normal.         Fluids    Intake/Output Summary (Last 24 hours) at 6/7/2020 0833  Last data filed at 6/7/2020 0581  Gross per 24 hour   Intake 2720 ml   Output 1300 ml   Net 1420 ml       Laboratory  Recent Labs     06/05/20  0824 06/06/20  0543    WBC 8.3 16.0*   RBC 4.63 4.71   HEMOGLOBIN 11.7* 12.0   HEMATOCRIT 37.6 37.7   MCV 81.2* 80.0*   MCH 25.3* 25.5*   MCHC 31.1* 31.8*   RDW 43.3 41.9   PLATELETCT 335 376   MPV 9.1 8.8*     Recent Labs     06/05/20  0824 06/06/20  0529   SODIUM 140 138   POTASSIUM 4.0 4.5   CHLORIDE 105 104   CO2 25 23   GLUCOSE 92 137*   BUN 12 12   CREATININE 0.80 0.84   CALCIUM 8.1* 8.2*                   Imaging  DX-PORTABLE FLUORO > 1 HOUR   Final Result      Intraoperative fluoroscopic spot images as described above.      [id number] VIEW   Final Result         1.  Moderate stool in the colon suggests changes of constipation, otherwise nonspecific bowel gas pattern   2.  Nephrolithiasis   3.  Bladder stones along the urinary stent.      US-RENAL   Final Result         1.  Moderate to severe right hydronephrosis and calyceal dilatation   2.  Echogenic liver compatible fatty change versus fibrosis.      DX-CYSTO FLUORO > 1 HOUR    (Results Pending)        Assessment/Plan  * Complicated UTI (urinary tract infection)- (present on admission)  Assessment & Plan  With sepsis, due to indwelling stent present over a year. With right sided severe hydronephrosis.   Urinalysis positive with negative to date cultures. Blood cultures negative to date.   Continue ceftriaxone 2 grams IV every 24 hours.  S/p laser litho and stent replacement with urology      Sepsis (HCC)- (present on admission)  Assessment & Plan  This is Sepsis Present on admission  SIRS criteria identified on my evaluation include: Tachycardia, with heart rate greater than 90 BPM  Source is UTI.  Sepsis protocol initiated  Fluid resuscitation ordered per protocol  IV antibiotics as appropriate for source of sepsis  While organ dysfunction may be noted elsewhere in this problem list or in the chart, degree of organ dysfunction does not meet CMS criteria for severe sepsis          Nephrolithiasis- (present on admission)  Assessment & Plan  Had bladder stone and ureteral  stone now removed with urology    Hydronephrosis- (present on admission)  Assessment & Plan  Due to stent in place for prolonged amount of time.  S/p surgery       VTE prophylaxis: scd

## 2020-06-07 NOTE — DISCHARGE PLANNING
LSW spoke with pt regarding transportation. Per pt, she does not have friends who can take her home. LSW stated she would look into [employer] bus and train tickets.     LSW placed call to pts pharmacy. Pts rx Cipro and Pyridium are zero co-pay. Flomax is being ordered and should be ready tomorrow.       LSW called pt back to let her know there is a bus available tomorrow for $23 and a train ticket available for $50. Pt stated to call her back as she is trying to see if her sister can take her.

## 2020-06-07 NOTE — PROGRESS NOTES
Pt in quite a bit of pain. Nauseated with small amounts of emesis. IV dislodged. Restarted. Bloody urine? Vs. Menses? On 2 lpm NC after dilaudid. Pt thinks she has food poisoning. Will discuss with MD.

## 2020-06-07 NOTE — CARE PLAN
Problem: Communication  Goal: The ability to communicate needs accurately and effectively will improve  Outcome: PROGRESSING AS EXPECTED     Problem: Bowel/Gastric:  Goal: Normal bowel function is maintained or improved  Outcome: PROGRESSING SLOWER THAN EXPECTED     Problem: Knowledge Deficit  Goal: Knowledge of disease process/condition, treatment plan, diagnostic tests, and medications will improve  Outcome: MET

## 2020-06-07 NOTE — CARE PLAN
Problem: Pain Management  Goal: Pain level will decrease to patient's comfort goal  Outcome: PROGRESSING AS EXPECTED   Patient medicated with oxycodone per MD orders. Encouraged the use of non-pharm measures of pain relief (i.e. heat packs) and to inform RN if pain is greater than comfort level.        Problem: Urinary Elimination:  Goal: Ability to reestablish a normal urinary elimination pattern will improve  Outcome: PROGRESSING SLOWER THAN EXPECTED   Pt still reports dysuria and has hematuria. Will continue to monitor.      Problem: Safety  Goal: Will remain free from injury  Outcome: PROGRESSING AS EXPECTED   Pt educated to call when in need. Call light and personal belongings w/n reach. Room free of clutters. Bed locked and in lowest position. Answer call light immediately.

## 2020-06-08 VITALS
HEIGHT: 64 IN | SYSTOLIC BLOOD PRESSURE: 119 MMHG | HEART RATE: 87 BPM | TEMPERATURE: 98 F | BODY MASS INDEX: 31.24 KG/M2 | WEIGHT: 182.98 LBS | DIASTOLIC BLOOD PRESSURE: 61 MMHG | RESPIRATION RATE: 18 BRPM | OXYGEN SATURATION: 95 %

## 2020-06-08 LAB
ALBUMIN SERPL BCP-MCNC: 2.9 G/DL (ref 3.2–4.9)
ALBUMIN/GLOB SERPL: 1 G/DL
ALP SERPL-CCNC: 74 U/L (ref 30–99)
ALT SERPL-CCNC: 7 U/L (ref 2–50)
ANION GAP SERPL CALC-SCNC: 10 MMOL/L (ref 7–16)
AST SERPL-CCNC: 11 U/L (ref 12–45)
BASOPHILS # BLD AUTO: 0.4 % (ref 0–1.8)
BASOPHILS # BLD: 0.07 K/UL (ref 0–0.12)
BILIRUB SERPL-MCNC: 0.2 MG/DL (ref 0.1–1.5)
BUN SERPL-MCNC: 10 MG/DL (ref 8–22)
CALCIUM SERPL-MCNC: 7.9 MG/DL (ref 8.4–10.2)
CHLORIDE SERPL-SCNC: 104 MMOL/L (ref 96–112)
CO2 SERPL-SCNC: 25 MMOL/L (ref 20–33)
CREAT SERPL-MCNC: 0.89 MG/DL (ref 0.5–1.4)
EOSINOPHIL # BLD AUTO: 0.04 K/UL (ref 0–0.51)
EOSINOPHIL NFR BLD: 0.3 % (ref 0–6.9)
ERYTHROCYTE [DISTWIDTH] IN BLOOD BY AUTOMATED COUNT: 45.4 FL (ref 35.9–50)
GLOBULIN SER CALC-MCNC: 2.8 G/DL (ref 1.9–3.5)
GLUCOSE SERPL-MCNC: 98 MG/DL (ref 65–99)
HCT VFR BLD AUTO: 31.5 % (ref 37–47)
HGB BLD-MCNC: 9.7 G/DL (ref 12–16)
IMM GRANULOCYTES # BLD AUTO: 0.17 K/UL (ref 0–0.11)
IMM GRANULOCYTES NFR BLD AUTO: 1.1 % (ref 0–0.9)
LYMPHOCYTES # BLD AUTO: 2.99 K/UL (ref 1–4.8)
LYMPHOCYTES NFR BLD: 18.7 % (ref 22–41)
MCH RBC QN AUTO: 25.5 PG (ref 27–33)
MCHC RBC AUTO-ENTMCNC: 30.8 G/DL (ref 33.6–35)
MCV RBC AUTO: 82.7 FL (ref 81.4–97.8)
MONOCYTES # BLD AUTO: 1.56 K/UL (ref 0–0.85)
MONOCYTES NFR BLD AUTO: 9.8 % (ref 0–13.4)
NEUTROPHILS # BLD AUTO: 11.16 K/UL (ref 2–7.15)
NEUTROPHILS NFR BLD: 69.7 % (ref 44–72)
NRBC # BLD AUTO: 0 K/UL
NRBC BLD-RTO: 0 /100 WBC
PLATELET # BLD AUTO: 337 K/UL (ref 164–446)
PMV BLD AUTO: 9.5 FL (ref 9–12.9)
POTASSIUM SERPL-SCNC: 4.1 MMOL/L (ref 3.6–5.5)
PROT SERPL-MCNC: 5.7 G/DL (ref 6–8.2)
RBC # BLD AUTO: 3.81 M/UL (ref 4.2–5.4)
SODIUM SERPL-SCNC: 139 MMOL/L (ref 135–145)
WBC # BLD AUTO: 16 K/UL (ref 4.8–10.8)

## 2020-06-08 PROCEDURE — 700102 HCHG RX REV CODE 250 W/ 637 OVERRIDE(OP): Performed by: HOSPITALIST

## 2020-06-08 PROCEDURE — A9270 NON-COVERED ITEM OR SERVICE: HCPCS | Performed by: HOSPITALIST

## 2020-06-08 PROCEDURE — 700111 HCHG RX REV CODE 636 W/ 250 OVERRIDE (IP): Performed by: HOSPITALIST

## 2020-06-08 PROCEDURE — 80053 COMPREHEN METABOLIC PANEL: CPT

## 2020-06-08 PROCEDURE — 85025 COMPLETE CBC W/AUTO DIFF WBC: CPT

## 2020-06-08 PROCEDURE — 700102 HCHG RX REV CODE 250 W/ 637 OVERRIDE(OP): Performed by: UROLOGY

## 2020-06-08 PROCEDURE — 99239 HOSP IP/OBS DSCHRG MGMT >30: CPT | Performed by: HOSPITALIST

## 2020-06-08 PROCEDURE — A9270 NON-COVERED ITEM OR SERVICE: HCPCS | Performed by: UROLOGY

## 2020-06-08 PROCEDURE — 700105 HCHG RX REV CODE 258: Performed by: HOSPITALIST

## 2020-06-08 PROCEDURE — 36415 COLL VENOUS BLD VENIPUNCTURE: CPT

## 2020-06-08 RX ORDER — ONDANSETRON 4 MG/1
4 TABLET, ORALLY DISINTEGRATING ORAL EVERY 6 HOURS PRN
Qty: 10 TAB | Refills: 0 | Status: SHIPPED | OUTPATIENT
Start: 2020-06-08

## 2020-06-08 RX ADMIN — PHENAZOPYRIDINE HYDROCHLORIDE 100 MG: 200 TABLET ORAL at 05:02

## 2020-06-08 RX ADMIN — SODIUM CHLORIDE, POTASSIUM CHLORIDE, SODIUM LACTATE AND CALCIUM CHLORIDE: 600; 310; 30; 20 INJECTION, SOLUTION INTRAVENOUS at 03:12

## 2020-06-08 RX ADMIN — ONDANSETRON 4 MG: 2 INJECTION INTRAMUSCULAR; INTRAVENOUS at 07:24

## 2020-06-08 RX ADMIN — OXYCODONE HYDROCHLORIDE 10 MG: 5 TABLET ORAL at 03:12

## 2020-06-08 RX ADMIN — CEFTRIAXONE SODIUM 2 G: 2 INJECTION, POWDER, FOR SOLUTION INTRAMUSCULAR; INTRAVENOUS at 05:05

## 2020-06-08 RX ADMIN — OXYCODONE HYDROCHLORIDE 5 MG: 5 TABLET ORAL at 07:24

## 2020-06-08 ASSESSMENT — COGNITIVE AND FUNCTIONAL STATUS - GENERAL
SUGGESTED CMS G CODE MODIFIER MOBILITY: CH
DAILY ACTIVITIY SCORE: 24
SUGGESTED CMS G CODE MODIFIER DAILY ACTIVITY: CH
MOBILITY SCORE: 24

## 2020-06-08 NOTE — DISCHARGE INSTRUCTIONS
Discharge Instructions    Discharged to home by car with self. Discharged via wheelchair, hospital escort: Yes.  Special equipment needed: Not Applicable    Be sure to schedule a follow-up appointment with your primary care doctor or any specialists as instructed.     Discharge Plan:   Diet Plan: Discussed  Activity Level: Discussed  Smoking Cessation Offered: Patient Refused  Confirmed Follow up Appointment: Patient to Call and Schedule Appointment  Confirmed Symptoms Management: Discussed  Medication Reconciliation Updated: Yes    I understand that a diet low in cholesterol, fat, and sodium is recommended for good health. Unless I have been given specific instructions below for another diet, I accept this instruction as my diet prescription.   Other diet: regular    Special Instructions:   Dietary Guidelines to Help Prevent Kidney Stones  Your risk of kidney stones can be decreased by adjusting the foods you eat. The most important thing you can do is drink enough fluid. You should drink enough fluid to keep your urine clear or pale yellow. The following guidelines provide specific information for the type of kidney stone you have had.  GUIDELINES ACCORDING TO TYPE OF KIDNEY STONE  Calcium Oxalate Kidney Stones  · Reduce the amount of salt you eat. Foods that have a lot of salt cause your body to release excess calcium into your urine. The excess calcium can combine with a substance called oxalate to form kidney stones.  · Reduce the amount of animal protein you eat if the amount you eat is excessive. Animal protein causes your body to release excess calcium into your urine. Ask your dietitian how much protein from animal sources you should be eating.  · Avoid foods that are high in oxalates. If you take vitamins, they should have less than 500 mg of vitamin C. Your body turns vitamin C into oxalates. You do not need to avoid fruits and vegetables high in vitamin C.  Calcium Phosphate Kidney Stones  · Reduce the  amount of salt you eat to help prevent the release of excess calcium into your urine.  · Reduce the amount of animal protein you eat if the amount you eat is excessive. Animal protein causes your body to release excess calcium into your urine. Ask your dietitian how much protein from animal sources you should be eating.  · Get enough calcium from food or take a calcium supplement (ask your dietitian for recommendations). Food sources of calcium that do not increase your risk of kidney stones include:  ¨ Broccoli.  ¨ Dairy products, such as cheese and yogurt.  ¨ Pudding.  Uric Acid Kidney Stones  · Do not have more than 6 oz of animal protein per day.  FOOD SOURCES  Animal Protein Sources  · Meat (all types).  · Poultry.  · Eggs.  · Fish, seafood.  Foods High in Salt  · Salt seasonings.  · Soy sauce.  · Teriyaki sauce.  · Cured and processed meats.  · Salted crackers and snack foods.  · Fast food.  · Canned soups and most canned foods.  Foods High in Oxalates  · Grains:  ¨ Amaranth.  ¨ Barley.  ¨ Grits.  ¨ Wheat germ.  ¨ Bran.  ¨ Buckwheat flour.  ¨ All bran cereals.  ¨ Pretzels.  ¨ Whole wheat bread.  · Vegetables:  ¨ Beans (wax).  ¨ Beets and beet greens.  ¨ Dixon greens.  ¨ Eggplant.  ¨ Escarole.  ¨ Leeks.  ¨ Okra.  ¨ Parsley.  ¨ Rutabagas.  ¨ Spinach.  ¨ Swiss chard.  ¨ Tomato paste.  ¨ Fried potatoes.  ¨ Sweet potatoes.  · Fruits:  ¨ Red currants.  ¨ Figs.  ¨ Kiwi.  ¨ Rhubarb.  · Meat and Other Protein Sources:  ¨ Beans (dried).  ¨ Soy burgers and other soybean products.  ¨ Miso.  ¨ Nuts (peanuts, almonds, pecans, cashews, hazelnuts).  ¨ Nut butters.  ¨ Sesame seeds and tahini (paste made of sesame seeds).  ¨ Poppy seeds.  · Beverages:  ¨ Chocolate drink mixes.  ¨ Soy milk.  ¨ Instant iced tea.  ¨ Juices made from high-oxalate fruits or vegetables.  · Other:  ¨ Carob.  ¨ Chocolate.  ¨ Fruitcake.  ¨ Marmalades.     This information is not intended to replace advice given to you by your health care provider.  Make sure you discuss any questions you have with your health care provider.     Document Released: 04/13/2012 Document Revised: 12/23/2014 Document Reviewed: 11/14/2014  [employer] Interactive Patient Education ©2016 [employer]  Lithotripsy, Care After  Refer to this sheet in the next few weeks. These instructions provide you with information on caring for yourself after your procedure. Your health care provider may also give you more specific instructions. Your treatment has been planned according to current medical practices, but problems sometimes occur. Call your health care provider if you have any problems or questions after your procedure.  WHAT TO EXPECT AFTER THE PROCEDURE   · Your urine may have a red tinge for a few days after treatment. Blood loss is usually minimal.  · You may have soreness in the back or flank area. This usually goes away after a few days. The procedure can cause blotches or bruises on the back where the pressure wave enters the skin. These marks usually cause only minimal discomfort and should disappear in a short time.  · Stone fragments should begin to pass within 24 hours of treatment. However, a delayed passage is not unusual.  · You may have pain, discomfort, and feel sick to your stomach (nauseated) when the crushed fragments of stone are passed down the tube from the kidney to the bladder. Stone fragments can pass soon after the procedure and may last for up to 4-8 weeks.  · A small number of patients may have severe pain when stone fragments are not able to pass, which leads to an obstruction.  · If your stone is greater than 1 inch (2.5 cm) in diameter or if you have multiple stones that have a combined diameter greater than 1 inch (2.5 cm), you may require more than one treatment.  · If you had a stent placed prior to your procedure, you may experience some discomfort, especially during urination. You may experience the pain or discomfort in your flank or back, or you may  "experience a sharp pain or discomfort at the base of your penis or in your lower abdomen. The discomfort usually lasts only a few minutes after urinating.  HOME CARE INSTRUCTIONS   · Rest at home until you feel your energy improving.  · Only take over-the-counter or prescription medicines for pain, discomfort, or fever as directed by your health care provider. Depending on the type of lithotripsy, you may need to take antibiotics and anti-inflammatory medicines for a few days.  · Drink enough water and fluids to keep your urine clear or pale yellow. This helps \"flush\" your kidneys. It helps pass any remaining pieces of stone and prevents stones from coming back.  · Most people can resume daily activities within 1-2 days after standard lithotripsy. It can take longer to recover from laser and percutaneous lithotripsy.  · If the stones are in your urinary system, you may be asked to strain your urine at home to look for stones. Any stones that are found can be sent to a medical lab for examination.  · Visit your health care provider for a follow-up appointment in a few weeks. Your doctor may remove your stent if you have one. Your health care provider will also check to see whether stone particles still remain.  SEEK MEDICAL CARE IF:   · Your pain is not relieved by medicine.  · You have a lasting nauseous feeling.  · You feel there is too much blood in the urine.  · You develop persistent problems with frequent or painful urination that does not at least partially improve after 2 days following the procedure.  · You have a congested cough.  · You feel lightheaded.  · You develop a rash or any other signs that might suggest an allergic problem.  · You develop any reaction or side effects to your medicine(s).  SEEK IMMEDIATE MEDICAL CARE IF:   · You experience severe back or flank pain or both.  · You see nothing but blood when you urinate.  · You cannot pass any urine at all.  · You have a fever or shaking " chills.  · You develop shortness of breath, difficulty breathing, or chest pain.  · You develop vomiting that will not stop after 6-8 hours.  · You have a fainting episode.     This information is not intended to replace advice given to you by your health care provider. Make sure you discuss any questions you have with your health care provider.     Document Released: 01/06/2009 Document Revised: 10/08/2014 Document Reviewed: 07/03/2014  [employer] Interactive Patient Education ©2016 [employer]      · Is patient discharged on Warfarin / Coumadin?   No     Depression / Suicide Risk    As you are discharged from this [facility] facility, it is important to learn how to keep safe from harming yourself.    Recognize the warning signs:  · Abrupt changes in personality, positive or negative- including increase in energy   · Giving away possessions  · Change in eating patterns- significant weight changes-  positive or negative  · Change in sleeping patterns- unable to sleep or sleeping all the time   · Unwillingness or inability to communicate  · Depression  · Unusual sadness, discouragement and loneliness  · Talk of wanting to die  · Neglect of personal appearance   · Rebelliousness- reckless behavior  · Withdrawal from people/activities they love  · Confusion- inability to concentrate     If you or a loved one observes any of these behaviors or has concerns about self-harm, here's what you can do:  · Talk about it- your feelings and reasons for harming yourself  · Remove any means that you might use to hurt yourself (examples: pills, rope, extension cords, firearm)  · Get professional help from the community (Mental Health, Substance Abuse, psychological counseling)  · Do not be alone:Call your Safe Contact- someone whom you trust who will be there for you.  · Call your local CRISIS HOTLINE 630-5874 or 801-109-8106  · Call your local Children's Mobile Crisis Response Team [place] (161) 368-3969 or  www.[employer]  · Call the toll free National Suicide Prevention Hotlines   · National Suicide Prevention Lifeline 949-480-SQBN (6358)  · National Hope Line Network 800-SUICIDE (978-9905)

## 2020-06-08 NOTE — CARE PLAN
A&Ox4, VSS, pain controlled with analgesics. POC discussed, denies further needs. Safety measures and hourly rounding in place.        Problem: Communication  Goal: The ability to communicate needs accurately and effectively will improve  Outcome: PROGRESSING AS EXPECTED  Intervention: [facility] patient and significant other/support system to call light to alert staff of needs  Note: Pt uses call light appropriately for needs.     Problem: Pain Management  Goal: Pain level will decrease to patient's comfort goal  Outcome: PROGRESSING AS EXPECTED  Intervention: Follow pain managment plan developed in collaboration with patient and Interdisciplinary Team  Note: Analgesics administered per MAR with good affect.

## 2020-06-08 NOTE — PROGRESS NOTES
Called Dr. Kidd and discussed discharge. OK for discharge. Pt discharged into care of self. [facility]. Discussed discharge medications, follow up, and when to contact the MD or go to the ED. Pt states understanding and asks no further questions. Escorted to taxi.

## 2020-06-08 NOTE — OP REPORT
DATE OF SERVICE:  06/05/2020    PREOPERATIVE DIAGNOSES:  1.  Encrusted long-term indwelling right ureteral stent placed for obstructing   pyelonephrosis.  2.  An 8 cm bladder stone on distal stent.  3.  A 2.5 cm ureteral stone on proximal stent.  4.  A 13-14 mm right renal pelvic stone.    PROCEDURES PERFORMED:  1.  Rigid cystourethroscopy.  2.  Holmium laser lithotripsy of 8 cm bladder stone.  3.  Right semi-rigid ureteroscopy with holmium laser lithotripsy of 2.5   ureteral stone on stent and subsequent right stent removal under fluoroscopy.  4.  Right stent placement, 6-Slovenian x 22 cm.    SURGEON:  Dragan Pitt MD    ANESTHESIOLOGIST:  Mena Andersen MD    ANESTHESIA:  General laryngeal mask.    POSTOPERATIVE DIAGNOSES:  1.  Encrusted long-term indwelling right ureteral stent placed for obstructing   pyelonephrosis.  2.  An 8 cm bladder stone on distal stent.  3.  A 2.5 cm ureteral stone on proximal stent.  4.  A 13-14 mm right renal pelvic stone.    COMPLICATIONS:  None.    DRAINS:  A 6-Slovenian x 22 cm stent in the right kidney.    INDICATIONS:  The patient is a 44-year-old woman with a history of an   obstructing right proximal ureteral stone and urosepsis.  She had a stent   placed by Dr. Gus Tee approximately 17 months ago and then relocated   to [place] without followup.  Without followup, she has developed dysuria,   urgency, frequency, and intermittent hematuria.  She has been found by x-rays   to have stent encrustation and a large bladder stone on the stent.  Prior to   surgery, I discussed with the patient the plan to perform cystoscopy, laser   lithotripsy of the distal stone, ureteroscopy to laser all of the stone on the   stent as well as the proximal ureteral component, which appears to have stone   on it.  I explained the definitive treatment of the stone is unlikely today   given the volume of stone to be treated as bleeding is expected during the   ureteroscopy as the stent is  encrusted.  We discussed the need for a stent.    She was reluctant to allow me to do this and I explained that I had to   position the stent after the procedure because removing the stent in this   setting is extremely challenging and can require multiple operative   procedures.  I discussed the risk of the procedure including, but not limited   to risk of urinary tract infection, urosepsis, risk of ureteral perforation,   risk of ureteral stricture as a delayed complication.  We discussed the fact   that the stent may break requiring percutaneous procedure and that she should   expect a minimum of 2-3 operations to render her stone free.  I explained the   rationale of leaving the stent to allow for subsequent treatment in the event   I am unable to complete the therapy today.  We also discussed perioperative   risk of stent migration, associated urgency, frequency and hematuria.  In   addition, she is aware of the perioperative risk of deep vein thrombosis,   pulmonary embolism, aspiration pneumonia, and death.  Informed consent was   given to me by the patient to proceed and she was marked on her right thigh   with my initials and a letter Y for safe site surgery.    DESCRIPTION IN DETAIL:  After informed consent was obtained, the patient was   brought to the operating room and placed supine.  Bilateral sequential   compression devices were in place and operational.  General laryngeal mask   anesthetic administered by Dr. Mena Andersen in a balanced fashion and the   patient received antibiotics.    A surgical time-out was called.  All members of the operative team agree as to   the patient's name, procedure to be performed without objections ____.    Fluoroscopy was available, confirmed the stent was in the right side.  At this   point in time, attention was directed towards procedure.  I began the   procedure by passing a generously lubricated 25-Maltese rigid cystoscope with   30-degree lens per urethra.   Inspection of the bladder shows approximately an   8-10 cm stone involving the entire stent.  I used three 1000-micron laser   fibers and was able to treat the entire stone.  Once the stone was treated, I   removed the majority of the stone with the exception of one small 1 cm   fragment.  At this point in time, after completing the treatment of the stone,   which was performed at 400 millijoules, 600, 800, and ultimately 1200   millijoules at a frequency of 5-10 Hz, I was able to render the bladder   essentially stone free.  Ellik evacuator was used to remove stone fragments   and these were submitted for chemical composition analysis.  After treating   the bladder stone, I passed a 0.35 Glidewire up into the kidney as documented   on fluoroscopy.  I then passed a semi-rigid ureteroscope with hydrodistention   and the patient had stones all on the ureter.  I was able to advance utilizing   a 200-micron laser fiber at a frequency of 5 Hz and 600 millijoules, I   treated the stones as I advanced up to the proximal ureter.  I used   fluoroscopy and pulled the stent down to the proximal ureter.  There was a   significant stone burden at the UPJ, so it would not allow me to pull it all   the way through, but with slight traction on this, I advanced the semirigid   scope and was able to treat the stone, which was approximately 2.5 cm on the   stent.  When I had treated this, I removed the stent in toto without any   fragmentation and the majority of the stone had been treated in the ureter and   drained.  However, there was some bleeding, which precluded me continuing and   so at this point in time, with the safety wire in place, I pushed up a new   6-Tamazight x 22 cm stent.  When I withdrew the guidewire, there was a good coil   in the kidney.  The 13 mm stone was visualized in the kidney.  The rest of the   stone fragments appeared gone and there was a good coil in the bladder.  At   this point in time, I attempted to  remove the final stone fragment, but the   laser fiber was discarded and I attempted to fragment the stone, which failed,   but because the patient will require surgery the following day with my   partner, I elected to leave this in place and we will discuss management of it   as the sign out was performed to perform the second stage of this complex   procedure.       ____________________________________     MD MARTIN Martin / ELLIS    DD:  06/08/2020 12:42:51  DT:  06/08/2020 13:52:48    D#:  [id number]  Job#:  [id number]

## 2020-06-13 LAB
APPEARANCE STONE: NORMAL
COMPN STONE: NORMAL
NUMBER STONE: NORMAL
SIZE STONE: NORMAL MM
SPECIMEN WT: NORMAL MG
